# Patient Record
Sex: FEMALE | Race: BLACK OR AFRICAN AMERICAN | Employment: FULL TIME | URBAN - METROPOLITAN AREA
[De-identification: names, ages, dates, MRNs, and addresses within clinical notes are randomized per-mention and may not be internally consistent; named-entity substitution may affect disease eponyms.]

---

## 2017-04-25 PROBLEM — E55.9 UNSPECIFIED VITAMIN D DEFICIENCY: Status: ACTIVE | Noted: 2017-04-25

## 2017-04-25 PROBLEM — I10 ESSENTIAL HYPERTENSION: Status: ACTIVE | Noted: 2017-04-25

## 2017-04-25 PROBLEM — F51.01 PRIMARY INSOMNIA: Status: ACTIVE | Noted: 2017-04-25

## 2017-04-25 PROBLEM — E03.9 UNSPECIFIED HYPOTHYROIDISM: Status: ACTIVE | Noted: 2017-04-25

## 2017-05-09 ENCOUNTER — EMPLOYEE WELLNESS (OUTPATIENT)
Dept: OTHER | Age: 57
End: 2017-05-09

## 2017-05-09 LAB
CHOLESTEROL, TOTAL: 183 MG/DL (ref 0–199)
GLUCOSE BLD-MCNC: 87 MG/DL (ref 74–107)
HDLC SERPL-MCNC: 65 MG/DL
LDL CHOLESTEROL CALCULATED: 99 MG/DL (ref 0–99)
TRIGL SERPL-MCNC: 96 MG/DL (ref 0–149)

## 2018-03-20 VITALS — BODY MASS INDEX: 31.24 KG/M2 | WEIGHT: 183 LBS

## 2018-04-09 ENCOUNTER — EMPLOYEE WELLNESS (OUTPATIENT)
Dept: OTHER | Age: 58
End: 2018-04-09

## 2018-04-09 LAB
CHOLESTEROL, TOTAL: 193 MG/DL (ref 0–199)
GLUCOSE BLD-MCNC: 101 MG/DL (ref 74–107)
HDLC SERPL-MCNC: 66 MG/DL
LDL CHOLESTEROL CALCULATED: 104 MG/DL (ref 0–99)
TRIGL SERPL-MCNC: 114 MG/DL (ref 0–149)

## 2018-04-16 VITALS — BODY MASS INDEX: 37.9 KG/M2 | WEIGHT: 222 LBS

## 2018-07-27 ENCOUNTER — HOSPITAL ENCOUNTER (OUTPATIENT)
Age: 58
Discharge: HOME | End: 2018-07-27
Payer: COMMERCIAL

## 2018-07-27 DIAGNOSIS — E03.8 OTHER SPECIFIED HYPOTHYROIDISM: ICD-10-CM

## 2018-07-27 DIAGNOSIS — E55.9 VITAMIN D DEFICIENCY: ICD-10-CM

## 2018-07-27 DIAGNOSIS — I10 ESSENTIAL HYPERTENSION: ICD-10-CM

## 2018-07-27 DIAGNOSIS — R73.9 HYPERGLYCEMIA: ICD-10-CM

## 2018-07-27 LAB
ANION GAP SERPL CALCULATED.3IONS-SCNC: 10 MMOL/L (ref 7–16)
BUN BLDV-MCNC: 16 MG/DL (ref 8–23)
CALCIUM SERPL-MCNC: 9.5 MG/DL (ref 8.6–10.2)
CHLORIDE BLD-SCNC: 99 MMOL/L (ref 98–107)
CO2: 27 MMOL/L (ref 22–29)
CREAT SERPL-MCNC: 0.9 MG/DL (ref 0.5–1)
GFR AFRICAN AMERICAN: >60
GFR NON-AFRICAN AMERICAN: >60 ML/MIN/1.73
GLUCOSE BLD-MCNC: 91 MG/DL (ref 74–109)
HBA1C MFR BLD: 6.2 % (ref 4–5.6)
POTASSIUM SERPL-SCNC: 3.9 MMOL/L (ref 3.5–5)
SODIUM BLD-SCNC: 136 MMOL/L (ref 132–146)
T4 FREE: 1.03 NG/DL (ref 0.93–1.7)
TSH SERPL DL<=0.05 MIU/L-ACNC: 4.21 UIU/ML (ref 0.27–4.2)
VITAMIN D 25-HYDROXY: 16 NG/ML (ref 30–100)

## 2018-07-27 PROCEDURE — 84443 ASSAY THYROID STIM HORMONE: CPT

## 2018-07-27 PROCEDURE — 83036 HEMOGLOBIN GLYCOSYLATED A1C: CPT

## 2018-07-27 PROCEDURE — 80048 BASIC METABOLIC PNL TOTAL CA: CPT

## 2018-07-27 PROCEDURE — 82306 VITAMIN D 25 HYDROXY: CPT

## 2018-07-27 PROCEDURE — 84439 ASSAY OF FREE THYROXINE: CPT

## 2018-07-27 PROCEDURE — 36415 COLL VENOUS BLD VENIPUNCTURE: CPT

## 2018-08-27 ENCOUNTER — HOSPITAL ENCOUNTER (EMERGENCY)
Age: 58
Discharge: HOME | End: 2018-08-27
Payer: COMMERCIAL

## 2018-08-27 ENCOUNTER — APPOINTMENT (OUTPATIENT)
Dept: GENERAL RADIOLOGY | Age: 58
End: 2018-08-27
Payer: COMMERCIAL

## 2018-08-27 VITALS
HEART RATE: 77 BPM | HEIGHT: 64 IN | RESPIRATION RATE: 18 BRPM | BODY MASS INDEX: 33.97 KG/M2 | OXYGEN SATURATION: 98 % | WEIGHT: 199 LBS | SYSTOLIC BLOOD PRESSURE: 180 MMHG | DIASTOLIC BLOOD PRESSURE: 89 MMHG | TEMPERATURE: 98.1 F

## 2018-08-27 DIAGNOSIS — M17.12 OSTEOARTHRITIS OF LEFT KNEE, UNSPECIFIED OSTEOARTHRITIS TYPE: Primary | ICD-10-CM

## 2018-08-27 PROCEDURE — 99283 EMERGENCY DEPT VISIT LOW MDM: CPT

## 2018-08-27 PROCEDURE — 73562 X-RAY EXAM OF KNEE 3: CPT

## 2018-08-27 RX ORDER — NAPROXEN 500 MG/1
500 TABLET ORAL 2 TIMES DAILY
Qty: 14 TABLET | Refills: 0 | Status: SHIPPED | OUTPATIENT
Start: 2018-08-27 | End: 2019-12-28

## 2018-08-27 RX ORDER — PREDNISONE 10 MG/1
40 TABLET ORAL DAILY
Qty: 20 TABLET | Refills: 0 | Status: SHIPPED | OUTPATIENT
Start: 2018-08-27 | End: 2018-09-01

## 2018-08-27 ASSESSMENT — PAIN DESCRIPTION - LOCATION: LOCATION: KNEE

## 2018-08-27 ASSESSMENT — PAIN DESCRIPTION - ORIENTATION: ORIENTATION: LEFT

## 2018-08-27 ASSESSMENT — PAIN SCALES - GENERAL: PAINLEVEL_OUTOF10: 7

## 2018-08-27 NOTE — ED PROVIDER NOTES
[facility]    Department of Emergency Medicine   ED  Provider Note  Admit Date/RoomTime: 8/27/2018  3:01 PM  ED Room: 22/22  Chief Complaint   Knee Pain (FOR QUITE AWHILE/ NO SPEC INJ)    History of Present Illness   Source of history provided by:  patient. History/Exam Limitations: none. Mary Ng is a 59 y.o. old female who has a past medical history of:   Past Medical History:   Diagnosis Date    H/O cardiovascular stress test 01/11/2017    Lexiscan    HTN (hypertension)     Hypothyroid     Organic insomnia     Persistent disorder of initiating or maintaining sleep       presents to the emergency department by private vehicle, for pain located medially to left knee  which occured 2 week(s) prior to arrival.  The complaint occurred as a result of No known trauma, however she noticed a bruise to the same area approximately 2 weeks ago  while at home. She has noticed worsening of her symptoms over the last 3 days. There has been a history of no prior problems with this area in the past.  Since onset the symptoms have been moderate in degree. Her pain is aggraveated by movement, use and palpation and relieved by nothing. Her weight bearing ability:  Normal but painful. ROS    Pertinent positives and negatives are stated within HPI, all other systems reviewed and are negative. History reviewed. No pertinent surgical history. Social History:  reports that she has never smoked. She has never used smokeless tobacco. She reports that she drinks alcohol. She reports that she does not use drugs. Family History: family history is not on file. Allergies: Ciprofloxacin and Doxycycline    Physical Exam          ED Triage Vitals [08/27/18 1458]   BP Temp Temp Source Pulse Resp SpO2 Height Weight   (!) 180/89 98.1 °F (36.7 °C) Oral 77 18 98 % 5' 4\" (1.626 m) 199 lb (90.3 kg)       Oxygen Saturation Interpretation: Normal.    Constitutional:  Alert, development consistent with age.   Neck:  Normal ROM.  Supple. Knee:  Left medial:             Tenderness:  moderate. Swelling/Effusion: None. Deformity: no.              ROM: full range of motion. Skin:  no erythema, rash or wounds noted. Drawer's:  Negative. Lachman's: Negative. Apley's: Not Tested. Lito's: Not Tested. Valgus/Varus Stress: Negative. Crepitus: Negative. Hip:            Tenderness:  none. Swelling: None. Deformity: no.              ROM: full range of motion. Skin:  no erythema, rash or wounds noted. Joint(s) Below: ankle. Tenderness:  none. Swelling: No.              Deformity: no.             ROM: full range of motion. Skin:  no erythema, rash or wounds noted. Neurovascular: Motor deficit: none. Sensory deficit: none. Pulse deficit: none. Capillary refill: normal.  Gait:  limp. Lymphatics: No lymphangitis or adenopathy noted. Neurological:  Oriented. Motor functions intact. Lab / Imaging Results   (All laboratory and radiology results have been personally reviewed by myself)  Labs:  No results found for this visit on 08/27/18. Imaging: All Radiology results interpreted by Radiologist unless otherwise noted. XR KNEE LEFT (3 VIEWS)   Final Result   Degenerative changes and chondrocalcinosis. ED Course / Medical Decision Making   Medications - No data to display     Consult(s):   None    Procedure(s):   none. Medical Decision Making:    Patient presents to the emergency department for left knee pain. Radiographs were significant for degenerative changes, but without any acute abnormalities. Patient will be given the prescriptions as well and should follow-up with her primary care provider, as necessary. Counseling:    The emergency provider has spoken with the patient and discussed todays results, in addition to providing specific details for the plan of care and counseling regarding the diagnosis and prognosis. Questions are answered at this time and they are agreeable with the plan. Assessment      1. Osteoarthritis of left knee, unspecified osteoarthritis type       Plan   Discharge to home  Patient condition is good    New Medications     New Prescriptions    NAPROXEN (NAPROSYN) 500 MG TABLET    Take 1 tablet by mouth 2 times daily for 7 days    PREDNISONE (DELTASONE) 10 MG TABLET    Take 4 tablets by mouth daily for 5 days     Electronically signed by Libia Oliver PA-C   DD: 8/27/18  **This report was transcribed using voice recognition software. Every effort was made to ensure accuracy; however, inadvertent computerized transcription errors may be present.   END OF ED PROVIDER NOTE        Libia Oliver PA-C  08/27/18 1670

## 2018-09-27 ENCOUNTER — EVALUATION (OUTPATIENT)
Dept: PHYSICAL THERAPY | Age: 58
End: 2018-09-27
Payer: COMMERCIAL

## 2018-09-27 DIAGNOSIS — M25.562 ACUTE PAIN OF LEFT KNEE: Primary | ICD-10-CM

## 2018-09-27 PROCEDURE — 97161 PT EVAL LOW COMPLEX 20 MIN: CPT | Performed by: PHYSICAL THERAPIST

## 2018-09-27 NOTE — PROGRESS NOTES
Training          GT     Neuro Re-education NR     Modalities     Non-Billable Service Time     Other     Total Time/Units 60 1
affecting plan of care or recovery    [] Barriers to this patient's plan of care or recovery include. Domestic Concerns:  [x] No  [] Yes:    Short Term goals (3 weeks)  1. Decrease reported pain to 0-4/10  2. Increase ROM to 115 flex, 0 ext  3. Increase Strength to 4+/5   4. Able to perform/complete the following functions/tasks: daily indoor/outdoor activities     Long Term goals (6 weeks)  1. Decrease reported pain to 0/10  2. Increase ROM to symmetrical  3. Increase Strength to 5/5   4. Able to perform/complete the following functions/tasks: participate in exercise regimen   5. Independent with Home Exercise Programs    Outcome Measure: PT G-Codes  Functional Limitation: Mobility: Walking and moving around  Mobility: Walking and Moving Around Current Status (): At least 40 percent but less than 60 percent impaired, limited or restricted  Mobility: Walking and Moving Around Goal Status (): 0 percent impaired, limited or restricted    Rehab Potential: [x] Good  [] Fair  [] Poor    PLAN       Treatment Plan:  [x] Therapeutic Exercise  [x] Therapeutic Activity  [x] Neuromuscular Re-education   [] Gait Training  [] Balance Training  [] Aerobic conditioning  [] Manual Therapy  [] Massage/Fascial release   [] Work/Sport specific activities    [] Pain Neuroscience [x] Cold/hotpack  [] Vasocompression  [] Electrical Stimulation  [] Lumbar/Cervical Traction  [] Ultrasound   [] Iontophoresis: 4 mg/mL Dexamethasone Sodium Phosphate 40-80 mAmin        [x] Instruction in HEP      []  Medication allergies reviewed for use of Dexamethasone Sodium Phosphate 4mg/ml  with iontophoresis treatments. Patient is not allergic. Suggested Professional Referral: [x] No  [] Yes:     Patient Education:  [x] Plans/Goals, Risks/Benefits discussed  [x] Home exercise program  Method of Education: [x] Verbal  [x] Demo  [x] Written  Comprehension of Education:  [x] Verbalizes understanding. [x] Demonstrates understanding.   []

## 2018-10-15 ENCOUNTER — TREATMENT (OUTPATIENT)
Dept: PHYSICAL THERAPY | Age: 58
End: 2018-10-15
Payer: COMMERCIAL

## 2018-10-15 DIAGNOSIS — M25.562 ACUTE PAIN OF LEFT KNEE: Primary | ICD-10-CM

## 2018-10-15 PROCEDURE — 97110 THERAPEUTIC EXERCISES: CPT

## 2018-10-18 ENCOUNTER — TREATMENT (OUTPATIENT)
Dept: PHYSICAL THERAPY | Age: 58
End: 2018-10-18
Payer: COMMERCIAL

## 2018-10-18 DIAGNOSIS — M25.562 ACUTE PAIN OF LEFT KNEE: Primary | ICD-10-CM

## 2018-10-18 PROCEDURE — 97016 VASOPNEUMATIC DEVICE THERAPY: CPT

## 2018-10-18 PROCEDURE — 97110 THERAPEUTIC EXERCISES: CPT

## 2018-10-18 NOTE — PROGRESS NOTES
Physical Therapy Daily Treatment Note    Date: 10/18/2018  Patient Name: Leonardo Mclain  : 1960   MRN: [id number]  DOInjury: ~6 months (~2018)  Referring Provider: Bella Lagunas, [facility],[address], [address] [facility]     Medical Diagnosis:     1. Acute pain of left knee  Outcome Measure:    PT G-Codes  Functional Limitation: Mobility: Walking and moving around  Mobility: Walking and Moving Around Current Status (): At least 40 percent but less than 60 percent impaired, limited or restricted  Mobility: Walking and Moving Around Goal Status (): 0 percent impaired, limited or restricted    Patient Goal: Get back to running and exercising     S: pt reports very sore with swelling  O:   Time 3712-7693     Visit 3 /18            Pain 7/10     ROM -5 ext, 105 flex     Modalities      Ice, compression, elevation  75 mmHg x 15 min  MO   Stretch      Heel prop Add as nec  TE   Knee flex stretch  Add as nec  TE   Exercise      Nustep L 5 x 10 min            TE   Bike      Treadmill      Heel slides 3 x 15  TE   QS 5 sec hold x 30  TE   SLR 3 x 10  TE   SAQ   TE   Bridging       Knee Extension Machine   TE   Hamstring Curl Machine   TE   [] TG  [] Leg Press 2-leg   TE   [] TG  [] Leg Press 1-leg   TE   Step-ups - FWD   TE   Step-ups - LAT   TE   Step-ups - BWD   TE   Calf Raises   TE      TE      TE               A:  Tolerated well.    P: Continue with rehab plan  Aguilar Tilley PTA    Treatment Charges: Mins Units   Initial Evaluation     Re-Evaluation     Ther Exercise         TE 30 min 2   Manual Therapy     MT     Ther Activities        TA     Gait Training          GT     Neuro Re-education NR     Modalities 15 1   Non-Billable Service Time     Other     Total Time/Units 45 3

## 2018-10-25 ENCOUNTER — TREATMENT (OUTPATIENT)
Dept: PHYSICAL THERAPY | Age: 58
End: 2018-10-25
Payer: COMMERCIAL

## 2018-10-25 DIAGNOSIS — M25.562 ACUTE PAIN OF LEFT KNEE: Primary | ICD-10-CM

## 2018-10-25 PROCEDURE — 97110 THERAPEUTIC EXERCISES: CPT | Performed by: PHYSICAL THERAPIST

## 2018-10-25 NOTE — PROGRESS NOTES
Physical Therapy Daily Treatment Note    Date: 10/25/2018  Patient Name: Phyllis Mcleod  : 1960   MRN: [id number]  DOInjury: ~6 months (~2018)  Referring Provider: Mary Ann Patten, [address] [facility],[address], [address] [facility]     Medical Diagnosis:     1. Acute pain of left knee  Outcome Measure:    PT G-Codes  Functional Limitation: Mobility: Walking and moving around  Mobility: Walking and Moving Around Current Status (): At least 40 percent but less than 60 percent impaired, limited or restricted  Mobility: Walking and Moving Around Goal Status (): 0 percent impaired, limited or restricted    Patient Goal: Get back to running and exercising     S: Pain increasing over the last few days due to increased weightbearing and use at home. O: TTP medial joint line and medal and lateral patella rim. Time 9931-3694     Visit             Pain 7/10     ROM      Modalities      Ice x 15 min  MO   Stretch      Heel prop Add as nec  TE   Knee flex stretch  Add as nec  TE   Exercise      Nustep L 3 x 10 min            TE   Bike      Treadmill      Heel slides 3 x 15 6\" black bolster under knee to limit extension TE   QS 5 sec hold 3 x 10 Triangle bolster under knee TE   SLR   TE   SAQ   TE   Bridging  Next      Knee Extension Machine   TE   Hamstring Curl Machine   TE   [] TG  [] Leg Press 2-leg   TE   [] TG  [] Leg Press 1-leg   TE   Step-ups - FWD   TE   Step-ups - LAT   TE   Step-ups - BWD   TE   Calf Raises   TE      TE      TE               A:  Tolerated well. Modified exercise as above due to recent painful onset. Patient reports these modifications allowed her to exercise with comfort. P: Continue with rehab plan. Will work on adding above exercises back in and adding weightbearing slowly.    Onita Course, PT    Treatment Charges: Mins Units   Initial Evaluation     Re-Evaluation     Ther Exercise         TE 30  2   Manual Therapy     MT     Ther Activities        TA     Gait Training          GT     Neuro Re-education NR     Modalities 15 0   Non-Billable Service Time     Other     Total Time/Units 45 2

## 2018-11-01 ENCOUNTER — TREATMENT (OUTPATIENT)
Dept: PHYSICAL THERAPY | Age: 58
End: 2018-11-01
Payer: COMMERCIAL

## 2018-11-01 DIAGNOSIS — M25.562 ACUTE PAIN OF LEFT KNEE: Primary | ICD-10-CM

## 2018-11-01 PROCEDURE — 97110 THERAPEUTIC EXERCISES: CPT

## 2018-11-29 ENCOUNTER — TREATMENT (OUTPATIENT)
Dept: PHYSICAL THERAPY | Age: 58
End: 2018-11-29
Payer: COMMERCIAL

## 2018-11-29 DIAGNOSIS — M25.562 ACUTE PAIN OF LEFT KNEE: Primary | ICD-10-CM

## 2018-11-29 PROCEDURE — 97110 THERAPEUTIC EXERCISES: CPT | Performed by: PHYSICAL THERAPIST

## 2019-03-08 ENCOUNTER — EMPLOYEE WELLNESS (OUTPATIENT)
Dept: OTHER | Age: 59
End: 2019-03-08

## 2019-03-08 LAB
CHOLESTEROL, TOTAL: 212 MG/DL (ref 0–199)
GLUCOSE BLD-MCNC: 115 MG/DL (ref 74–107)
HDLC SERPL-MCNC: 63 MG/DL
LDL CHOLESTEROL CALCULATED: 120 MG/DL (ref 0–99)
TRIGL SERPL-MCNC: 147 MG/DL (ref 0–149)

## 2019-03-20 VITALS — WEIGHT: 223 LBS | BODY MASS INDEX: 38.28 KG/M2

## 2019-06-20 ENCOUNTER — HOSPITAL ENCOUNTER (OUTPATIENT)
Age: 59
Discharge: HOME | End: 2019-06-20
Payer: COMMERCIAL

## 2019-06-20 DIAGNOSIS — D50.0 IRON DEFICIENCY ANEMIA SECONDARY TO BLOOD LOSS (CHRONIC): ICD-10-CM

## 2019-06-20 DIAGNOSIS — E03.9 PRIMARY HYPOTHYROIDISM: ICD-10-CM

## 2019-06-20 DIAGNOSIS — E78.01 FAMILIAL HYPERCHOLESTEROLEMIA: ICD-10-CM

## 2019-06-20 DIAGNOSIS — E55.9 VITAMIN D DEFICIENCY: ICD-10-CM

## 2019-06-20 LAB
ALBUMIN SERPL-MCNC: 4.3 G/DL (ref 3.5–5.2)
ALP BLD-CCNC: 69 U/L (ref 35–104)
ALT SERPL-CCNC: 54 U/L (ref 0–32)
ANION GAP SERPL CALCULATED.3IONS-SCNC: 11 MMOL/L (ref 7–16)
AST SERPL-CCNC: 33 U/L (ref 0–31)
BILIRUB SERPL-MCNC: 0.4 MG/DL (ref 0–1.2)
BUN BLDV-MCNC: 17 MG/DL (ref 8–23)
CALCIUM SERPL-MCNC: 10 MG/DL (ref 8.6–10.2)
CHLORIDE BLD-SCNC: 100 MMOL/L (ref 98–107)
CO2: 29 MMOL/L (ref 22–29)
CREAT SERPL-MCNC: 1 MG/DL (ref 0.5–1)
GFR AFRICAN AMERICAN: >60
GFR NON-AFRICAN AMERICAN: >60 ML/MIN/1.73
GLUCOSE BLD-MCNC: 103 MG/DL (ref 74–99)
HBA1C MFR BLD: 6.2 % (ref 4–5.6)
HCT VFR BLD CALC: 37.8 % (ref 34–48)
HEMOGLOBIN: 12.3 G/DL (ref 11.5–15.5)
MCH RBC QN AUTO: 28.1 PG (ref 26–35)
MCHC RBC AUTO-ENTMCNC: 32.5 % (ref 32–34.5)
MCV RBC AUTO: 86.3 FL (ref 80–99.9)
PDW BLD-RTO: 14.7 FL (ref 11.5–15)
PLATELET # BLD: 245 E9/L (ref 130–450)
PMV BLD AUTO: 11.1 FL (ref 7–12)
POTASSIUM SERPL-SCNC: 3.9 MMOL/L (ref 3.5–5)
RBC # BLD: 4.38 E12/L (ref 3.5–5.5)
SODIUM BLD-SCNC: 140 MMOL/L (ref 132–146)
T4 FREE: 1.17 NG/DL (ref 0.93–1.7)
TOTAL PROTEIN: 8.1 G/DL (ref 6.4–8.3)
TSH SERPL DL<=0.05 MIU/L-ACNC: 6.3 UIU/ML (ref 0.27–4.2)
VITAMIN D 25-HYDROXY: 30 NG/ML (ref 30–100)
WBC # BLD: 5.9 E9/L (ref 4.5–11.5)

## 2019-06-20 PROCEDURE — 82306 VITAMIN D 25 HYDROXY: CPT

## 2019-06-20 PROCEDURE — 85027 COMPLETE CBC AUTOMATED: CPT

## 2019-06-20 PROCEDURE — 84439 ASSAY OF FREE THYROXINE: CPT

## 2019-06-20 PROCEDURE — 84443 ASSAY THYROID STIM HORMONE: CPT

## 2019-06-20 PROCEDURE — 36415 COLL VENOUS BLD VENIPUNCTURE: CPT

## 2019-06-20 PROCEDURE — 80053 COMPREHEN METABOLIC PANEL: CPT

## 2019-06-20 PROCEDURE — 83036 HEMOGLOBIN GLYCOSYLATED A1C: CPT

## 2019-11-26 ENCOUNTER — HOSPITAL ENCOUNTER (OUTPATIENT)
Dept: MAMMOGRAPHY | Age: 59
Discharge: HOME | End: 2019-11-28
Payer: COMMERCIAL

## 2019-11-26 DIAGNOSIS — N64.4 BREAST PAIN, LEFT: ICD-10-CM

## 2019-11-26 DIAGNOSIS — Z12.31 SCREENING MAMMOGRAM FOR HIGH-RISK PATIENT: ICD-10-CM

## 2019-11-26 PROCEDURE — 77063 BREAST TOMOSYNTHESIS BI: CPT

## 2019-12-28 ENCOUNTER — HOSPITAL ENCOUNTER (EMERGENCY)
Age: 59
Discharge: HOME | End: 2019-12-28
Payer: COMMERCIAL

## 2019-12-28 ENCOUNTER — APPOINTMENT (OUTPATIENT)
Dept: GENERAL RADIOLOGY | Age: 59
End: 2019-12-28
Payer: COMMERCIAL

## 2019-12-28 VITALS
RESPIRATION RATE: 20 BRPM | BODY MASS INDEX: 38.65 KG/M2 | OXYGEN SATURATION: 95 % | WEIGHT: 226.38 LBS | DIASTOLIC BLOOD PRESSURE: 86 MMHG | SYSTOLIC BLOOD PRESSURE: 136 MMHG | TEMPERATURE: 101.2 F | HEIGHT: 64 IN | HEART RATE: 100 BPM

## 2019-12-28 DIAGNOSIS — J02.9 ACUTE PHARYNGITIS, UNSPECIFIED ETIOLOGY: ICD-10-CM

## 2019-12-28 DIAGNOSIS — J40 BRONCHITIS: ICD-10-CM

## 2019-12-28 DIAGNOSIS — J01.10 ACUTE NON-RECURRENT FRONTAL SINUSITIS: Primary | ICD-10-CM

## 2019-12-28 PROCEDURE — 99212 OFFICE O/P EST SF 10 MIN: CPT

## 2019-12-28 PROCEDURE — 71046 X-RAY EXAM CHEST 2 VIEWS: CPT

## 2019-12-28 PROCEDURE — 6370000000 HC RX 637 (ALT 250 FOR IP): Performed by: PHYSICIAN ASSISTANT

## 2019-12-28 RX ORDER — AMOXICILLIN AND CLAVULANATE POTASSIUM 500; 125 MG/1; MG/1
1 TABLET, FILM COATED ORAL 3 TIMES DAILY
Qty: 30 TABLET | Refills: 0 | Status: SHIPPED | OUTPATIENT
Start: 2019-12-28 | End: 2020-01-07

## 2019-12-28 RX ORDER — ZOLPIDEM TARTRATE 5 MG/1
5 TABLET ORAL NIGHTLY PRN
COMMUNITY
End: 2020-11-30 | Stop reason: SDUPTHER

## 2019-12-28 RX ORDER — GUAIFENESIN AND DEXTROMETHORPHAN HYDROBROMIDE 1200; 60 MG/1; MG/1
1 TABLET, EXTENDED RELEASE ORAL EVERY 12 HOURS PRN
Qty: 12 TABLET | Refills: 0 | Status: SHIPPED | OUTPATIENT
Start: 2019-12-28 | End: 2020-09-08

## 2019-12-28 RX ORDER — ACETAMINOPHEN 500 MG
1000 TABLET ORAL ONCE
Status: COMPLETED | OUTPATIENT
Start: 2019-12-28 | End: 2019-12-28

## 2019-12-28 RX ORDER — ALBUTEROL SULFATE 90 UG/1
2 AEROSOL, METERED RESPIRATORY (INHALATION) 4 TIMES DAILY PRN
Qty: 1 INHALER | Refills: 0 | Status: SHIPPED | OUTPATIENT
Start: 2019-12-28 | End: 2020-11-30 | Stop reason: SDUPTHER

## 2019-12-28 RX ORDER — PREDNISONE 10 MG/1
TABLET ORAL
Qty: 14 TABLET | Refills: 0 | Status: SHIPPED | OUTPATIENT
Start: 2019-12-28 | End: 2020-09-08

## 2019-12-28 RX ADMIN — ACETAMINOPHEN 1000 MG: 500 TABLET, FILM COATED ORAL at 17:16

## 2019-12-28 ASSESSMENT — PAIN DESCRIPTION - ONSET: ONSET: ON-GOING

## 2019-12-28 ASSESSMENT — PAIN SCALES - GENERAL: PAINLEVEL_OUTOF10: 8

## 2019-12-28 ASSESSMENT — PAIN DESCRIPTION - PROGRESSION: CLINICAL_PROGRESSION: GRADUALLY WORSENING

## 2019-12-28 ASSESSMENT — PAIN DESCRIPTION - FREQUENCY: FREQUENCY: CONTINUOUS

## 2019-12-28 ASSESSMENT — PAIN DESCRIPTION - PAIN TYPE: TYPE: ACUTE PAIN

## 2019-12-28 ASSESSMENT — PAIN DESCRIPTION - LOCATION: LOCATION: HEAD

## 2019-12-28 ASSESSMENT — PAIN DESCRIPTION - DESCRIPTORS: DESCRIPTORS: ACHING

## 2020-01-24 ENCOUNTER — HOSPITAL ENCOUNTER (OUTPATIENT)
Age: 60
Discharge: HOME | End: 2020-01-24
Payer: COMMERCIAL

## 2020-01-24 LAB
ALBUMIN SERPL-MCNC: 4.6 G/DL (ref 3.5–5.2)
ALP BLD-CCNC: 54 U/L (ref 35–104)
ALT SERPL-CCNC: 32 U/L (ref 0–32)
ANION GAP SERPL CALCULATED.3IONS-SCNC: 14 MMOL/L (ref 7–16)
AST SERPL-CCNC: 26 U/L (ref 0–31)
BILIRUB SERPL-MCNC: 0.6 MG/DL (ref 0–1.2)
BUN BLDV-MCNC: 22 MG/DL (ref 8–23)
CALCIUM SERPL-MCNC: 10.5 MG/DL (ref 8.6–10.2)
CHLORIDE BLD-SCNC: 97 MMOL/L (ref 98–107)
CHOLESTEROL, TOTAL: 170 MG/DL (ref 0–199)
CO2: 29 MMOL/L (ref 22–29)
CREAT SERPL-MCNC: 1 MG/DL (ref 0.5–1)
GFR AFRICAN AMERICAN: >60
GFR NON-AFRICAN AMERICAN: >60 ML/MIN/1.73
GLUCOSE BLD-MCNC: 103 MG/DL (ref 74–99)
HBA1C MFR BLD: 6.1 % (ref 4–5.6)
HDLC SERPL-MCNC: 57 MG/DL
LDL CHOLESTEROL CALCULATED: 91 MG/DL (ref 0–99)
POTASSIUM SERPL-SCNC: 3.6 MMOL/L (ref 3.5–5)
SODIUM BLD-SCNC: 140 MMOL/L (ref 132–146)
T4 FREE: 0.89 NG/DL (ref 0.93–1.7)
TOTAL PROTEIN: 8.3 G/DL (ref 6.4–8.3)
TRIGL SERPL-MCNC: 109 MG/DL (ref 0–149)
TSH SERPL DL<=0.05 MIU/L-ACNC: 5.25 UIU/ML (ref 0.27–4.2)
VLDLC SERPL CALC-MCNC: 22 MG/DL

## 2020-01-24 PROCEDURE — 84439 ASSAY OF FREE THYROXINE: CPT

## 2020-01-24 PROCEDURE — 83036 HEMOGLOBIN GLYCOSYLATED A1C: CPT

## 2020-01-24 PROCEDURE — 80061 LIPID PANEL: CPT

## 2020-01-24 PROCEDURE — 36415 COLL VENOUS BLD VENIPUNCTURE: CPT

## 2020-01-24 PROCEDURE — 80053 COMPREHEN METABOLIC PANEL: CPT

## 2020-01-24 PROCEDURE — 84443 ASSAY THYROID STIM HORMONE: CPT

## 2020-09-07 NOTE — PROGRESS NOTES
facility-administered medications for this visit. Allergies as of 09/08/2020 - Review Complete 09/04/2020   Allergen Reaction Noted    Ciprofloxacin  07/16/2018    Doxycycline  01/11/2017       Social History     Socioeconomic History    Marital status:      Spouse name: Not on file    Number of children: Not on file    Years of education: Not on file    Highest education level: Not on file   Occupational History    Not on file   Social Needs    Financial resource strain: Not on file    Food insecurity     Worry: Not on file     Inability: Not on file   [employer] needs     Medical: Not on file     Non-medical: Not on file   Tobacco Use    Smoking status: Never Smoker    Smokeless tobacco: Never Used   Substance and Sexual Activity    Alcohol use: Yes     Comment: socially.   No coffee for a week     Drug use: No    Sexual activity: Yes     Partners: Male   Lifestyle    Physical activity     Days per week: Not on file     Minutes per session: Not on file    Stress: Not on file   Relationships    Social connections     Talks on phone: Not on file     Gets together: Not on file     Attends [employer] service: Not on file     Active member of club or organization: Not on file     Attends meetings of clubs or organizations: Not on file     Relationship status: Not on file    Intimate partner violence     Fear of current or ex partner: Not on file     Emotionally abused: Not on file     Physically abused: Not on file     Forced sexual activity: Not on file   Other Topics Concern    Not on file   Social History Narrative    Not on file       Family History   Problem Relation Age of Onset    Kidney Disease Mother     Cancer Father        REVIEW OF SYSTEMS:     CONSTITUTIONAL:  negative for  fevers, chills, sweats, + fatigue  EYES:  negative for  double vision, blurred vision and blind spots  HEENT:  negative for  tinnitus, earaches, nasal congestion and epistaxis  RESPIRATORY: negative for  dry cough, cough with sputum, dyspnea, wheezing and hemoptysis  CARDIOVASCULAR: as per HPI  GASTROINTESTINAL:  negative for nausea, vomiting, diarrhea, constipation, pruritus and jaundice  GENITOURINARY:  negative for frequency, dysuria, nocturia, urinary incontinence and hesitancy  HEMATOLOGIC/LYMPHATIC:  negative for easy bruising, bleeding, lymphadenopathy and petechiae  ALLERGIC/IMMUNOLOGIC:  negative for urticaria, hay fever and angioedema  ENDOCRINE:  negative for heat intolerance, cold intolerance, tremor, hair loss and diabetic symptoms including neither polyuria nor polydipsia nor blurred vision  MUSCULOSKELETAL:  negative for  myalgias, arthralgias, joint swelling, stiff joints and decreased range of motion  NEUROLOGICAL:  negative for memory problems, speech problems, visual disturbance, dysphagia, weakness and numbness      PHYSICAL EXAM:   CONSTITUTIONAL:  awake, alert, cooperative, no apparent distress, and appears stated age  EYES:  lids and lashes normal and pupils equal, round and reactive to light, anicteric sclerae  HEAD:  normocepalic, without obvious abnormality, atraumatic, pink, moist mucous membranes. NECK:  Supple, symmetrical, trachea midline, no adenopathy, thyroid symmetric, not enlarged and no tenderness, skin normal  HEMATOLOGIC/LYMPHATICS:  no cervical lymphadenopathy and no supraclavicular lymphadenopathy  LUNGS:  No increased work of breathing, good air exchange, clear to auscultation bilaterally, no crackles or wheezing  CARDIOVASCULAR:  Normal apical impulse, regular rate and rhythm, normal S1 and S2, no S3 or S4, and no murmur noted and no JVD, no carotid bruit, no pedal edema, good carotid upstroke bilaterally. ABDOMEN:  Soft, nontender, no masses, no hepatomegaly or splenomegaly, BS+  CHEST: nontender to palpation, expands symmetrically  MUSCULOSKELETAL:  No clubbing no cyanosis. there is no redness, warmth, or swelling of the joints  full range of motion noted  NEUROLOGIC:  Alert, awake,oriented x3, no focal neurologic deficit was appreciated  SKIN:  no bruising or bleeding, normal skin color, texture, turgor and no redness, warmth, or swelling    /86 (Site: Left Upper Arm, Position: Sitting, Cuff Size: Medium Adult)   Pulse 70   Ht 5' 4\" (1.626 m)   Wt 200 lb (90.7 kg)   BMI 34.33 kg/m²     DATA:   I personally reviewed the visit EKG with the following interpretation: Sinus rhythm, poor R wave progression    EKG 1/3/17 Sinus Rhythm    ECHO: 1/11/17 Summary   No previous echo for comparison. No previous echo for comparison. Mild concentric left ventricular hypertrophy   Ejection fraction is visually estimated at 65%. No regional wall motion abnormalities seen. There is doppler evidence of stage II diastolic dysfunction. Mild mitral regurgitation is present. Mild tricuspid regurgitation. RVSP is 39 mmHg. Pulmonary hypertension is mild . Stress Test: 1/11/17   Findings:    Both the stress and resting myocardial images are normal.         The gated left ventricular ejection fraction is 68 %.                Impression    1.  Normal study           Cardiology Labs: BMP:    Lab Results   Component Value Date     09/09/2020    K 3.8 09/09/2020    CL 98 09/09/2020    CO2 25 09/09/2020    BUN 17 09/09/2020    CREATININE 0.9 09/09/2020     CMP:    Lab Results   Component Value Date     09/09/2020    K 3.8 09/09/2020    CL 98 09/09/2020    CO2 25 09/09/2020    BUN 17 09/09/2020    CREATININE 0.9 09/09/2020    PROT 8.4 09/09/2020     CBC:    Lab Results   Component Value Date    WBC 5.9 09/09/2020    RBC 4.34 09/09/2020    HGB 12.5 09/09/2020    HCT 38.3 09/09/2020    MCV 88.2 09/09/2020    RDW 14.6 09/09/2020    PLT 75 09/09/2020     PT/INR:  No results found for: PTINR  PT/INR Warfarin:  No components found for: PTPATWAR, PTINRWAR  PTT:  No results found for: APTT  PTT Heparin:  No components found for: APTTHEP  Magnesium:    Lab Results Component Value Date    MG 2.1 06/26/2014     TSH:    Lab Results   Component Value Date    TSH 8.990 09/09/2020     TROPONIN:  No components found for: TROP  BNP:  No results found for: BNP  FASTING LIPID PANEL:    Lab Results   Component Value Date    CHOL 170 01/24/2020    HDL 57 01/24/2020    TRIG 109 01/24/2020     NM Cardiac Stress Test Nuclear Imaging    (Results Pending)     I have personally reviewed the laboratory, cardiac diagnostic and radiographic testing as outlined above:      IMPRESSION:  1. Chest pain: Atypical, abnormal EKG with poor R wave progression, risk factors for CAD including hypertension and family history of CAD, will schedule for nuclear exercise stress test  2. Palpitations: Will schedule for Holter monitor for further evaluation  3. Hypothyroidism: On supplements    RECOMMENDATIONS:   1. Nuclear exercise stress test  2. Holter monitor  3. CBC, basic metabolic panel and TSH  4. Patient was strongly advised to call 911 if symptoms recur or get worse for any reason  5. Follow-up with Dr. Neil Velazquez as scheduled  6. Follow-up with Dr. Marcelo Hart after her tests    I have reviewed my findings and recommendations with patient    Thank you for the consult  Electronically signed by Tommy Salgado MD on 9/13/2020 at 7:53 PM      NOTE: This report was transcribed using voice recognition software.  Every effort was made to ensure accuracy; however, inadvertent computerized transcription errors may be present

## 2020-09-08 ENCOUNTER — NURSE ONLY (OUTPATIENT)
Dept: CARDIOLOGY CLINIC | Age: 60
End: 2020-09-08

## 2020-09-08 ENCOUNTER — OFFICE VISIT (OUTPATIENT)
Dept: CARDIOLOGY CLINIC | Age: 60
End: 2020-09-08
Payer: COMMERCIAL

## 2020-09-08 VITALS
DIASTOLIC BLOOD PRESSURE: 86 MMHG | WEIGHT: 200 LBS | BODY MASS INDEX: 34.15 KG/M2 | SYSTOLIC BLOOD PRESSURE: 132 MMHG | HEIGHT: 64 IN | HEART RATE: 70 BPM

## 2020-09-08 PROCEDURE — 99244 OFF/OP CNSLTJ NEW/EST MOD 40: CPT | Performed by: INTERNAL MEDICINE

## 2020-09-08 PROCEDURE — 93000 ELECTROCARDIOGRAM COMPLETE: CPT | Performed by: INTERNAL MEDICINE

## 2020-09-08 NOTE — PROGRESS NOTES
Patient was seen today by Dr. Veronica Parham and a 24 hour holter monitor was ordered. She was given instructions and voiced understanding. Monitor was applied.

## 2020-09-09 ENCOUNTER — HOSPITAL ENCOUNTER (OUTPATIENT)
Age: 60
Discharge: HOME | End: 2020-09-09
Payer: COMMERCIAL

## 2020-09-09 LAB
ALBUMIN SERPL-MCNC: 4.5 G/DL (ref 3.5–5.2)
ALP BLD-CCNC: 66 U/L (ref 35–104)
ALT SERPL-CCNC: 19 U/L (ref 0–32)
ANION GAP SERPL CALCULATED.3IONS-SCNC: 15 MMOL/L (ref 7–16)
AST SERPL-CCNC: 27 U/L (ref 0–31)
BASOPHILS ABSOLUTE: 0.02 E9/L (ref 0–0.2)
BASOPHILS RELATIVE PERCENT: 0.3 % (ref 0–2)
BILIRUB SERPL-MCNC: 0.4 MG/DL (ref 0–1.2)
BUN BLDV-MCNC: 17 MG/DL (ref 8–23)
CALCIUM SERPL-MCNC: 10 MG/DL (ref 8.6–10.2)
CHLORIDE BLD-SCNC: 98 MMOL/L (ref 98–107)
CO2: 25 MMOL/L (ref 22–29)
CREAT SERPL-MCNC: 0.9 MG/DL (ref 0.5–1)
EOSINOPHILS ABSOLUTE: 0.09 E9/L (ref 0.05–0.5)
EOSINOPHILS RELATIVE PERCENT: 1.5 % (ref 0–6)
GFR AFRICAN AMERICAN: >60
GFR NON-AFRICAN AMERICAN: >60 ML/MIN/1.73
GLUCOSE BLD-MCNC: 94 MG/DL (ref 74–99)
HCT VFR BLD CALC: 38.3 % (ref 34–48)
HEMOGLOBIN: 12.5 G/DL (ref 11.5–15.5)
IMMATURE GRANULOCYTES #: 0.01 E9/L
IMMATURE GRANULOCYTES %: 0.2 % (ref 0–5)
LYMPHOCYTES ABSOLUTE: 2.86 E9/L (ref 1.5–4)
LYMPHOCYTES RELATIVE PERCENT: 48.2 % (ref 20–42)
MCH RBC QN AUTO: 28.8 PG (ref 26–35)
MCHC RBC AUTO-ENTMCNC: 32.6 % (ref 32–34.5)
MCV RBC AUTO: 88.2 FL (ref 80–99.9)
MONOCYTES ABSOLUTE: 0.48 E9/L (ref 0.1–0.95)
MONOCYTES RELATIVE PERCENT: 8.1 % (ref 2–12)
NEUTROPHILS ABSOLUTE: 2.47 E9/L (ref 1.8–7.3)
NEUTROPHILS RELATIVE PERCENT: 41.7 % (ref 43–80)
PDW BLD-RTO: 14.6 FL (ref 11.5–15)
PLATELET # BLD: 75 E9/L (ref 130–450)
PLATELET CONFIRMATION: NORMAL
PMV BLD AUTO: 11.6 FL (ref 7–12)
POTASSIUM SERPL-SCNC: 3.8 MMOL/L (ref 3.5–5)
RBC # BLD: 4.34 E12/L (ref 3.5–5.5)
SODIUM BLD-SCNC: 138 MMOL/L (ref 132–146)
TOTAL PROTEIN: 8.4 G/DL (ref 6.4–8.3)
TSH SERPL DL<=0.05 MIU/L-ACNC: 8.99 UIU/ML (ref 0.27–4.2)
WBC # BLD: 5.9 E9/L (ref 4.5–11.5)

## 2020-09-09 PROCEDURE — 84443 ASSAY THYROID STIM HORMONE: CPT

## 2020-09-09 PROCEDURE — 80053 COMPREHEN METABOLIC PANEL: CPT

## 2020-09-09 PROCEDURE — 36415 COLL VENOUS BLD VENIPUNCTURE: CPT

## 2020-09-09 PROCEDURE — 85025 COMPLETE CBC W/AUTO DIFF WBC: CPT

## 2020-09-18 ENCOUNTER — TELEPHONE (OUTPATIENT)
Dept: CARDIOLOGY | Age: 60
End: 2020-09-18

## 2020-09-22 ENCOUNTER — HOSPITAL ENCOUNTER (OUTPATIENT)
Dept: CARDIOLOGY | Age: 60
Discharge: HOME | End: 2020-09-22
Payer: COMMERCIAL

## 2020-09-22 VITALS
HEIGHT: 64 IN | SYSTOLIC BLOOD PRESSURE: 122 MMHG | WEIGHT: 200 LBS | DIASTOLIC BLOOD PRESSURE: 74 MMHG | BODY MASS INDEX: 34.15 KG/M2 | OXYGEN SATURATION: 98 % | RESPIRATION RATE: 20 BRPM | HEART RATE: 60 BPM

## 2020-09-22 LAB
LV EF: 73 %
LVEF MODALITY: NORMAL

## 2020-09-22 PROCEDURE — 3430000000 HC RX DIAGNOSTIC RADIOPHARMACEUTICAL: Performed by: INTERNAL MEDICINE

## 2020-09-22 PROCEDURE — A9502 TC99M TETROFOSMIN: HCPCS | Performed by: INTERNAL MEDICINE

## 2020-09-22 PROCEDURE — 78452 HT MUSCLE IMAGE SPECT MULT: CPT

## 2020-09-22 PROCEDURE — 93017 CV STRESS TEST TRACING ONLY: CPT

## 2020-09-22 PROCEDURE — 93018 CV STRESS TEST I&R ONLY: CPT | Performed by: INTERNAL MEDICINE

## 2020-09-22 PROCEDURE — 2580000003 HC RX 258: Performed by: INTERNAL MEDICINE

## 2020-09-22 PROCEDURE — 93016 CV STRESS TEST SUPVJ ONLY: CPT | Performed by: INTERNAL MEDICINE

## 2020-09-22 RX ORDER — SODIUM CHLORIDE 0.9 % (FLUSH) 0.9 %
10 SYRINGE (ML) INJECTION PRN
Status: DISCONTINUED | OUTPATIENT
Start: 2020-09-22 | End: 2020-09-23 | Stop reason: HOSPADM

## 2020-09-22 RX ADMIN — SODIUM CHLORIDE, PRESERVATIVE FREE 10 ML: 5 INJECTION INTRAVENOUS at 08:02

## 2020-09-22 RX ADMIN — TETROFOSMIN 31 MILLICURIE: 0.23 INJECTION, POWDER, LYOPHILIZED, FOR SOLUTION INTRAVENOUS at 09:24

## 2020-09-22 RX ADMIN — SODIUM CHLORIDE, PRESERVATIVE FREE 10 ML: 5 INJECTION INTRAVENOUS at 09:24

## 2020-09-22 RX ADMIN — TETROFOSMIN 10 MILLICURIE: 0.23 INJECTION, POWDER, LYOPHILIZED, FOR SOLUTION INTRAVENOUS at 08:02

## 2020-09-22 NOTE — PROCEDURES
[address] [facility]. Molly Almonte [address] [place], [place] [phone]                 Exercise Stress Nuclear Gated SPECT Study    Name: [address] [place] [address] Account Number: [de-identified]    :  1960      Sex: female              Date of Study:  2020    Height: 5' 4\" (162.6 cm)  Weight: 200 lb (90.7 kg)     Ordering Provider: Christa Sanders MD          PCP: Lisa Gavin DO      Cardiologist: Christa Sanders MD                        Interpreting Physician: Christa Sanders MD  _________________________________________________________________________________    Indication:   Chest Pain    Clinical History:   Patient has no known history of coronary artery disease. Resting ECG:    Sinus bradycardia    Exercise: The patient exercised using a Marlo protocol, completing 4:32 minutes and reaching an estimated work load of 4.85 metabolic equivalents (METS). Resting HR was 60. Peak exercise heart rate was 150 ( 94% of maximum predicted heart rate for age). Baseline /88. Peak exercise /71. The blood pressure response to exercise was normal      Exercise was terminated due to heart rate attained, fatigue, progressive dyspnea. The patient experienced no chest pain with exercise. Pulse oximetry was used to monitor oxygen saturation during the stress test.  The study was performed on Room Air. The resting pulse oximeter was 98%. The post stress O2 saturation seen during exercise was 97 %. Exercise ECG:   The patient demonstrated occasional PVC's during exercise. With exercise, there were no ST segment changes of significance at the heart rate achieved. IMAGING: Myocardial perfusion imaging was performed at rest 30-35 minutes following the intravenous injection of 9.9 mCi of (Tc-tetrofosmin) followed by 10 ml of Normal Saline.   At peak exercise, the patient was injected intravenously with 31.8 mCi of (Tc-tetrofosmin) followed by 10 ml of Normal Saline. Gated post-stress tomographic imaging was performed 20-25 minutes after stress. FINDINGS: The overall quality of the study was good. Left ventricular cavity size was noted to be normal.    Rotational analog analysis demonstrated soft tissue breast attenuation. The gated SPECT stress imaging in the short, vertical long, and horizontal long axis demonstrated very small, mild apical reversible defect most likely represents soft tissue attenuation artifact    Gated SPECT left ventricular ejection fraction was calculated to be 73%, with normal myocardial thickening and wall motion. Impression:    1. Exercise EKG was normal.  2. The patient experienced no chest pain with exercise. 3. The myocardial perfusion imaging was normal with attenuation artifact. 4. Overall left ventricular systolic function was normal without regional wall motion abnormalities. 5. Exercise capacity was below average. 6. Low risk general exercise stress test.    Thank you for sending your patient to this [employer].      Electronically signed by Esau Silva MD on 9/22/20 at 5:16 PM EDT

## 2020-09-28 ENCOUNTER — HOSPITAL ENCOUNTER (OUTPATIENT)
Age: 60
Discharge: HOME | End: 2020-09-28
Payer: COMMERCIAL

## 2020-09-28 LAB
HCT VFR BLD CALC: 36.6 % (ref 34–48)
HEMOGLOBIN: 11.8 G/DL (ref 11.5–15.5)
MCH RBC QN AUTO: 28.9 PG (ref 26–35)
MCHC RBC AUTO-ENTMCNC: 32.2 % (ref 32–34.5)
MCV RBC AUTO: 89.5 FL (ref 80–99.9)
PDW BLD-RTO: 15.3 FL (ref 11.5–15)
PLATELET # BLD: 187 E9/L (ref 130–450)
PMV BLD AUTO: 11.1 FL (ref 7–12)
RBC # BLD: 4.09 E12/L (ref 3.5–5.5)
WBC # BLD: 5.4 E9/L (ref 4.5–11.5)

## 2020-09-28 PROCEDURE — 85027 COMPLETE CBC AUTOMATED: CPT

## 2020-09-28 PROCEDURE — 36415 COLL VENOUS BLD VENIPUNCTURE: CPT

## 2021-01-20 DIAGNOSIS — U07.1 COVID-19 VIRUS INFECTION: Primary | ICD-10-CM

## 2021-01-21 ENCOUNTER — HOSPITAL ENCOUNTER (OUTPATIENT)
Age: 61
Discharge: HOME | End: 2021-01-21
Payer: COMMERCIAL

## 2021-01-21 LAB — SARS-COV-2 ANTIBODY, TOTAL: REACTIVE

## 2021-01-21 PROCEDURE — 86769 SARS-COV-2 COVID-19 ANTIBODY: CPT

## 2021-01-21 PROCEDURE — 36415 COLL VENOUS BLD VENIPUNCTURE: CPT

## 2021-02-19 ENCOUNTER — HOSPITAL ENCOUNTER (OUTPATIENT)
Age: 61
Discharge: HOME | End: 2021-02-19
Payer: COMMERCIAL

## 2021-02-19 DIAGNOSIS — R73.9 HYPERGLYCEMIA: ICD-10-CM

## 2021-02-19 DIAGNOSIS — D50.0 IRON DEFICIENCY ANEMIA SECONDARY TO BLOOD LOSS (CHRONIC): ICD-10-CM

## 2021-02-19 DIAGNOSIS — E03.9 PRIMARY HYPOTHYROIDISM: ICD-10-CM

## 2021-02-19 DIAGNOSIS — E78.01 FAMILIAL HYPERCHOLESTEROLEMIA: ICD-10-CM

## 2021-02-19 LAB
ALBUMIN SERPL-MCNC: 5.2 G/DL (ref 3.5–5.2)
ALP BLD-CCNC: 72 U/L (ref 35–104)
ALT SERPL-CCNC: 32 U/L (ref 0–32)
ANION GAP SERPL CALCULATED.3IONS-SCNC: 14 MMOL/L (ref 7–16)
AST SERPL-CCNC: 30 U/L (ref 0–31)
BASOPHILS ABSOLUTE: 0 E9/L (ref 0–0.2)
BASOPHILS RELATIVE PERCENT: 0 % (ref 0–2)
BILIRUB SERPL-MCNC: 0.5 MG/DL (ref 0–1.2)
BUN BLDV-MCNC: 26 MG/DL (ref 8–23)
CALCIUM SERPL-MCNC: 10.6 MG/DL (ref 8.6–10.2)
CHLORIDE BLD-SCNC: 97 MMOL/L (ref 98–107)
CHOLESTEROL, TOTAL: 181 MG/DL (ref 0–199)
CO2: 29 MMOL/L (ref 22–29)
CREAT SERPL-MCNC: 1.3 MG/DL (ref 0.5–1)
EOSINOPHILS ABSOLUTE: 0 E9/L (ref 0.05–0.5)
EOSINOPHILS RELATIVE PERCENT: 0 % (ref 0–6)
GFR AFRICAN AMERICAN: 50
GFR NON-AFRICAN AMERICAN: 50 ML/MIN/1.73
GLUCOSE BLD-MCNC: 115 MG/DL (ref 74–99)
HBA1C MFR BLD: 6.1 % (ref 4–5.6)
HCT VFR BLD CALC: 39.5 % (ref 34–48)
HDLC SERPL-MCNC: 60 MG/DL
HEMOGLOBIN: 13 G/DL (ref 11.5–15.5)
IMMATURE GRANULOCYTES #: 0.01 E9/L
IMMATURE GRANULOCYTES %: 0.2 % (ref 0–5)
LDL CHOLESTEROL CALCULATED: 104 MG/DL (ref 0–99)
LYMPHOCYTES ABSOLUTE: 2.26 E9/L (ref 1.5–4)
LYMPHOCYTES RELATIVE PERCENT: 45.3 % (ref 20–42)
MCH RBC QN AUTO: 28.9 PG (ref 26–35)
MCHC RBC AUTO-ENTMCNC: 32.9 % (ref 32–34.5)
MCV RBC AUTO: 87.8 FL (ref 80–99.9)
MONOCYTES ABSOLUTE: 0.41 E9/L (ref 0.1–0.95)
MONOCYTES RELATIVE PERCENT: 8.2 % (ref 2–12)
NEUTROPHILS ABSOLUTE: 2.31 E9/L (ref 1.8–7.3)
NEUTROPHILS RELATIVE PERCENT: 46.3 % (ref 43–80)
PDW BLD-RTO: 14.9 FL (ref 11.5–15)
PLATELET # BLD: 250 E9/L (ref 130–450)
PMV BLD AUTO: 11.2 FL (ref 7–12)
POTASSIUM SERPL-SCNC: 3.5 MMOL/L (ref 3.5–5)
RBC # BLD: 4.5 E12/L (ref 3.5–5.5)
SODIUM BLD-SCNC: 140 MMOL/L (ref 132–146)
TOTAL PROTEIN: 8.8 G/DL (ref 6.4–8.3)
TRIGL SERPL-MCNC: 85 MG/DL (ref 0–149)
TSH SERPL DL<=0.05 MIU/L-ACNC: 7.5 UIU/ML (ref 0.27–4.2)
VITAMIN D 25-HYDROXY: 36 NG/ML (ref 30–100)
VLDLC SERPL CALC-MCNC: 17 MG/DL
WBC # BLD: 5 E9/L (ref 4.5–11.5)

## 2021-02-19 PROCEDURE — 36415 COLL VENOUS BLD VENIPUNCTURE: CPT

## 2021-02-19 PROCEDURE — 80053 COMPREHEN METABOLIC PANEL: CPT

## 2021-02-19 PROCEDURE — 84443 ASSAY THYROID STIM HORMONE: CPT

## 2021-02-19 PROCEDURE — 83036 HEMOGLOBIN GLYCOSYLATED A1C: CPT

## 2021-02-19 PROCEDURE — 82306 VITAMIN D 25 HYDROXY: CPT

## 2021-02-19 PROCEDURE — 85025 COMPLETE CBC W/AUTO DIFF WBC: CPT

## 2021-02-19 PROCEDURE — 80061 LIPID PANEL: CPT

## 2021-03-31 ENCOUNTER — OFFICE VISIT (OUTPATIENT)
Dept: PHYSICAL MEDICINE AND REHAB | Age: 61
End: 2021-03-31
Payer: COMMERCIAL

## 2021-03-31 VITALS — HEIGHT: 65 IN | WEIGHT: 201 LBS | BODY MASS INDEX: 33.49 KG/M2

## 2021-03-31 DIAGNOSIS — G56.03 BILATERAL CARPAL TUNNEL SYNDROME: ICD-10-CM

## 2021-03-31 PROCEDURE — 95910 NRV CNDJ TEST 7-8 STUDIES: CPT | Performed by: PHYSICAL MEDICINE & REHABILITATION

## 2021-03-31 PROCEDURE — 95886 MUSC TEST DONE W/N TEST COMP: CPT | Performed by: PHYSICAL MEDICINE & REHABILITATION

## 2021-03-31 NOTE — PATIENT INSTRUCTIONS
Electrodiagnotic Laboratory  Accredited by the [facility] with Exemplary status  ESMER Hernandez D.O. [facility]  [address] [facility] [place]  Phone: [phone]  Fax: [phone]        Today you had an electrodiagnostic exam which included nerve conduction studies (NCS) and electromyography (EMG). This test evaluated the electrical activity of your nerves and muscles to help determine if you have a nerve or muscle disease. This test can help determine the location and type of a nerve or muscle problem. This will help your referring doctor diagnose your condition and determine the appropriate next step in your treatment plan. After your test:    1. There are no long lasting side effects of the test.     2. You may resume your normal activities without restrictions. 3.  Resume any medications that were stopped for the test.     4  If you have sore areas or bruising in your muscles where the needle was placed, apply a cold pack to the sore area for 15-20 minutes three to four times a day as needed for pain. The soreness should go away in about 1-2 days. 5. Your results were provided  Briefly at the end of your test and the final detailed report will be provided to your referring physician, and/or primary care physician and any other parties you requested within 1-2 days of the examination. You may wish to contact your referring provider after a few days to determine what they would like you to do next. 6.  Please call [phone] with any questions or concerns and if you develop increased body temperature/fever, swelling, tenderness, increased pain and/or drainage from the sites where the needle was placed. Thank you for choosing us for your health care needs.

## 2021-03-31 NOTE — PROGRESS NOTES
[address] [facility]  Electrodiagnostic Laboratory  *Accredited by the [address] [place] [address] with exemplary status  [address] [facility] [address]  Phone: [phone]  Fax: [phone]    Referring Provider: Pete Tompkins DO  Primary Care Physician: Da Lezama DO  Patient Name: Elisabeth Foley  Patient YOB: 1960  Gender: female  BMI: Body mass index is 33.45 kg/m². Height 5' 5\" (1.651 m), weight 201 lb (91.2 kg). 3/31/2021    Description of clinical problem:   Chief Complaint   Patient presents with    Extremity Pain     pain from hand to elbow. 9/10 pain. pain is a lot worse in the morning. R is worse. 2+ years just getting worse    Numbness     entire hand has numbness.  Extremity Weakness     due to numbness/pain there is some weakness. Sensory NCS      Nerve / Sites Rec. Site Peak Lat PP Amp Segments Distance Velocity Temp. ms µV  cm m/s °C   R Median - Digit II (Antidromic)      Palm Dig II 3.02 13.0 Palm - Dig II 7 33 32.2      Wrist Dig II 6.09* 12.1 Wrist - Dig II 14 32 32.4   L Median - Digit II (Antidromic)      Palm Dig II 2.60 19.2 Palm - Dig II 7 34 32      Wrist Dig II 5.78* 17.4 Wrist - Dig II 14 33 32   R Ulnar - Digit V (Antidromic)      Wrist Dig V 3.65 39.1 Wrist - Dig V 14 51 32.1   R Radial - Anatomical snuff box (Forearm)      Forearm Wrist 2.60 21.1 Forearm - Wrist 10 49 33.6       Motor NCS      Nerve / Sites Muscle Onset Amplitude Segments Distance Velocity Temp.     ms mV  cm m/s °C   R Median - APB      Palm APB 1.41 17.1 Palm - APB   32.7      Wrist APB 5.63* 14.6 Wrist - Palm 8 19* 33      Elbow APB 9.06 14.8 Elbow - Wrist 22 64 33   L Median - APB      Palm APB 2.03 15.1 Palm - APB   32      Wrist APB 5.26* 12.2 Wrist - Palm 8 25* 32.1      Elbow APB 8.54 12.2 Elbow - Wrist 21 64 32.1   R Ulnar - ADM      Wrist ADM 2.55 10.2 Wrist - ADM 8  33.9      B. Elbow ADM 5.99 7.8 B. Elbow - Wrist 20 58 32.7      A. Elbow ADM 7.92 7.0 A. Elbow - B.Elbow 10 52 32.5       F Wave      Nerve Fmin % F    ms %   R Median - APB 32.66* 100   R Ulnar - ADM 28.96 100   L Median - APB 32.03* 100       EMG      EMG Summary Table     Spontaneous MUAP Recruitment   Muscle Nerve Roots IA Fib PSW Fasc Amp Dur. PPP Pattern   R. Biceps brachii Musculocutaneous C5-C6 N None None None N N N N   R. Triceps brachii Radial C6-C8 N None None None N N N N   R. Pronator teres Median C6-C7 N None None None N N N N   R. First dorsal interosseous Ulnar C8-T1 N None None None N N N N   R. Abductor pollicis brevis Median T6-A3 N None None None N N N N   L. Biceps brachii Musculocutaneous C5-C6 N None None None N N N N   L. Triceps brachii Radial C6-C8 N None None None N N N N   L. Pronator teres Median C6-C7 N None None None N N N N   L. First dorsal interosseous Ulnar C8-T1 N None None None N N N N   L. Abductor pollicis brevis Median H9-H8 N None None None N N N N      Study Limitations:  none    Summary of Findings:   Nerve conduction studies:   · The following nerve conduction studies were abnormal:   · Bilateral median sensory latency at the wrist was prolonged. · Bilateral median motor latency at the wrist was prolonged with focal slowing across the wrist. Bilateral median minimal F wave was prolonged. · All other nerve conduction studies listed in the table above were normal in latency, amplitude and conduction velocity. Needle EMG:   · Needle EMG was performed using a concentric needle.  All muscles tested, as listed in the table above demonstrated normal amplitude, duration, phases and recruitment and no active denervation signs were seen. Diagnostic Interpretation: This study was abnormal.     Electrodiagnosis: There is electrodiagnostic evidence of a median mononeuropathy.    · Location: bilateral at the wrist.   · Nature: [  ] Axonal   [ X ] Demyelinating  [  ] Mixed axonal and demyelinating     [  ] Sensory [  ] Motor               [ X ] Mixed sensorimotor     [  ] with active denervation       Maybelle.Million ] without active denervation  · Duration: Acute  · Severity: moderate  · Prognosis: Good. The prognosis for recovery of demyelinating lesions is good if the cause is alleviated. Previous Study: no      Follow up EMG is recommended if no surgical intervention and symptoms persist in one year. Technologist: Ruby Song  Physician:    Tonya Irene D.O., P.T. Board Certified Physical Medicine and Rehabilitation  Board Certified Electrodiagnostic Medicine      Nerve conduction studies and electromyography were performed according to our laboratory policies and procedures which can be provided upon request. All abnormal values are identified in the table.  Laboratory normal values can also be provided upon request.       Cc: DO Chelo Burgess DO

## 2021-08-23 ENCOUNTER — OFFICE VISIT (OUTPATIENT)
Dept: ORTHOPEDIC SURGERY | Age: 61
End: 2021-08-23
Payer: COMMERCIAL

## 2021-08-23 VITALS — BODY MASS INDEX: 36.65 KG/M2 | WEIGHT: 220 LBS | HEIGHT: 65 IN | TEMPERATURE: 98 F

## 2021-08-23 DIAGNOSIS — G56.02 LEFT CARPAL TUNNEL SYNDROME: ICD-10-CM

## 2021-08-23 DIAGNOSIS — G56.01 RIGHT CARPAL TUNNEL SYNDROME: Primary | ICD-10-CM

## 2021-08-23 PROCEDURE — G8427 DOCREV CUR MEDS BY ELIG CLIN: HCPCS | Performed by: ORTHOPAEDIC SURGERY

## 2021-08-23 PROCEDURE — G8417 CALC BMI ABV UP PARAM F/U: HCPCS | Performed by: ORTHOPAEDIC SURGERY

## 2021-08-23 PROCEDURE — 3017F COLORECTAL CA SCREEN DOC REV: CPT | Performed by: ORTHOPAEDIC SURGERY

## 2021-08-23 PROCEDURE — 99204 OFFICE O/P NEW MOD 45 MIN: CPT | Performed by: ORTHOPAEDIC SURGERY

## 2021-08-23 PROCEDURE — 1036F TOBACCO NON-USER: CPT | Performed by: ORTHOPAEDIC SURGERY

## 2021-08-23 NOTE — PROGRESS NOTES
Chief Complaint   Patient presents with    Carpal Tunnel     b/l carpal tunnel had EMG 3/31/21. Maru Campa is a 59y.o. year old   @female@ who presents for evaluation of bilateral wrist pain. she reports this started several years ago. she does not remember a specific injury that started the pain. .  The injury was none. The pain is located mainly in the right palm, right thumb, index and middle fingers. The pain is worse with repetitive activities and better with rest.  The patient has tried splints,  OTC analgesics, ice, avoidance of offending activity. The treatment has not been effective. The patient is Right hand dominant. Patient's occupation is a at [employer]. Past Medical History:   Diagnosis Date    H/O cardiovascular stress test 01/11/2017    Lexiscan    HTN (hypertension)     Hypothyroid     Organic insomnia     Persistent disorder of initiating or maintaining sleep     Sarcoidosis      History reviewed. No pertinent surgical history.     Current Outpatient Medications:     amLODIPine (NORVASC) 5 MG tablet, TAKE 1 TABLET BY MOUTH ONE TIME A DAY, Disp: 90 tablet, Rfl: 0    ergocalciferol (ERGOCALCIFEROL) 1.25 MG (10678 UT) capsule, weekly, Disp: 12 capsule, Rfl: 0    levothyroxine (SYNTHROID) 50 MCG tablet, Take 1 tablet by mouth daily, Disp: 90 tablet, Rfl: 0    SYNTHROID 25 MCG tablet, Take 1 tablet by mouth Daily, Disp: 90 tablet, Rfl: 1    olmesartan-hydroCHLOROthiazide (BENICAR HCT) 40-25 MG per tablet, Take 1 tablet by mouth daily, Disp: 90 tablet, Rfl: 1    albuterol sulfate HFA (PROVENTIL HFA) 108 (90 Base) MCG/ACT inhaler, Inhale 2 puffs into the lungs 4 times daily as needed for Wheezing, Disp: 1 Inhaler, Rfl: 0    levothyroxine (SYNTHROID) 25 MCG tablet, Take 1 tablet by mouth daily, Disp: 30 tablet, Rfl: 5  Allergies   Allergen Reactions    Doxycycline Hives     Social History     Socioeconomic History    Marital status:  Spouse name: Not on file    Number of children: Not on file    Years of education: Not on file    Highest education level: Not on file   Occupational History    Not on file   Tobacco Use    Smoking status: Former Smoker     Packs/day: 0.25     Years: 15.00     Pack years: 3.75     Quit date: 2000     Years since quittin.9    Smokeless tobacco: Never Used   Vaping Use    Vaping Use: Never used   Substance and Sexual Activity    Alcohol use: Yes     Comment: socially. No coffee for a week     Drug use: No    Sexual activity: Yes     Partners: Male   Other Topics Concern    Not on file   Social History Narrative    Not on file     Social Determinants of Health     Financial Resource Strain: Low Risk     Difficulty of Paying Living Expenses: Not hard at all   Food Insecurity: No Food Insecurity    Worried About Running Out of Food in the Last Year: Never true    [address] in the Last Year: Never true   Transportation Needs:     Lack of Transportation (Medical):  Lack of Transportation (Non-Medical):    Physical Activity:     Days of Exercise per Week:     Minutes of Exercise per Session:    Stress:     Feeling of Stress :    Social Connections:     Frequency of Communication with Friends and Family:     Frequency of Social Gatherings with Friends and Family:     Attends Quaker Services:     Active Member of Clubs or Organizations:     Attends Club or Organization Meetings:     Marital Status:    Intimate Partner Violence:     Fear of Current or Ex-Partner:     Emotionally Abused:     Physically Abused:     Sexually Abused:      Family History   Problem Relation Age of Onset    Kidney Disease Mother     Cancer Father        REVIEW OF SYSTEMS:     General/Constitution:  (-)weight loss, (-)fever, (-)chills, (-)weakness. Skin: (-) rash,(-) psoriasis,(-) eczema, (-)skin cancer.    Musculoskeletal: (-) fractures,  (-) dislocations,(-) collagen vascular disease, (-) is no evidence of deformity of the wrist.  ROM Wrist ROM R wrist DF 90, VF 90, L wrist DF 90, VF 90, R pronation 90/ supination 90, L pronation 90/supination 90. Motor strength is 5/5 with Dorsiflexion/Volarflexion/Supination/Pronation.  strength 4/5. Thenar eminence appears bilaterally symmetrical.   Motor and sensation is intact and symmetric throughout the bilateral upper extremities in the  ulnar and radial , musclcutaneous, and axillary nerve distributions. There is paresthesia in the median nerve distribution of the right hand. Hand exam:    The skin overlying the hand is  intact. There is not evidence of scar, lesion, laceration, or abrasion. The motion in the small joints of the hand are intact with no stiffness or deformity. The ROM in the MCP flexion 90/ extension 0 , PIP flexion 90/ extension 0, DIP flexion 70/ extension 0. There is not rotational deformity. There is no masses or adenopathy in bilateral upper extremities. Radial pulses are 2+ and symmetric bilaterally. Capillary refill is intact and < 2 seconds. Motor strength is 5/5 with flexion and extension of the small finger joints. Right:  Phallens sign(+), Tinnells sign (+), Median nerve compression test (+),  Finklesteins (-), [employer] Grind test (-), [employer](-). Left:  Phallens sign(+), Tinnells sign (+), Median nerve compression test (+),  Finklesteins (-), [employer] Grind test (-), [employer](-). X-rays:  n/a    EMG:  This study was abnormal.       Electrodiagnosis: There is electrodiagnostic evidence of a median mononeuropathy. \" Location: bilateral at the wrist.   \" Nature: [  ] Axonal   [ X ] Demyelinating  [  ] Mixed axonal and demyelinating                      [  ] Sensory [  ] Motor               [ X ] Mixed sensorimotor                      [  ] with active denervation       Severianus.Yuma ] without active denervation   \" Duration: Acute   \" Severity: moderate   \" Prognosis: Good.  The prognosis for recovery of demyelinating lesions is good if the cause is alleviated.       Previous Study: no         Follow up EMG is recommended if no surgical intervention and symptoms persist in one year      Impression:   Encounter Diagnoses   Name Primary?  Right carpal tunnel syndrome Yes    Left carpal tunnel syndrome        Plan: Natural history and expected course discussed. Questions answered. Educational materials distributed. Rest, ice, compression, and elevation (RICE) therapy. Reduction in offending activity discussed. Volar wrist splint to be worn at night. I had a lengthy discussion with the patient regarding their diagnosis. I explained treatment options including surgical vs non surgical treatment. I reviewed in detail the risks and benefits and outlined the procedure in detail with expected outcomes and possible complications. I also discussed non surgical treatment such as injections (CSI), physical therapy, topical creams and NSAID's. They have elected for surgicval management at this time. We will perform a Right carpal tunnel release , she will callw ith a date. The risks and benefits were reviewed with the patient such as:  DVT, infection,  injuries to blood vessels and nerves, non relief of symptoms, continued pain, worsening of symptoms. The patient was counseled at length about the risks of kallie Covid-19 during their perioperative period and any recovery window from their procedure. The patient was made aware that kallie Covid-19  may worsen their prognosis for recovering from their procedure  and lend to a higher morbidity and/or mortality risk. All material risks, benefits, and reasonable alternatives including postponing the procedure were discussed. The patient does wish to proceed with the procedure at this time.   At least 45 minutes was spent discussing the diagnosis and treatment options with the patient with at least 50% of the time was spent with decision making and counseling the patient.

## 2022-09-24 ENCOUNTER — HOSPITAL ENCOUNTER (OUTPATIENT)
Dept: MAMMOGRAPHY | Age: 62
Discharge: HOME | End: 2022-09-26
Payer: COMMERCIAL

## 2022-09-24 DIAGNOSIS — Z12.31 SCREENING MAMMOGRAM FOR HIGH-RISK PATIENT: ICD-10-CM

## 2022-09-24 PROCEDURE — 77063 BREAST TOMOSYNTHESIS BI: CPT

## 2022-12-20 ENCOUNTER — HOSPITAL ENCOUNTER (OUTPATIENT)
Age: 62
Discharge: HOME | End: 2022-12-20
Payer: COMMERCIAL

## 2022-12-20 DIAGNOSIS — R73.9 HYPERGLYCEMIA: ICD-10-CM

## 2022-12-20 DIAGNOSIS — E55.9 VITAMIN D DEFICIENCY: ICD-10-CM

## 2022-12-20 DIAGNOSIS — E78.01 FAMILIAL HYPERCHOLESTEROLEMIA: ICD-10-CM

## 2022-12-20 LAB
ALBUMIN SERPL-MCNC: 4.6 G/DL (ref 3.5–5.2)
ALP BLD-CCNC: 71 U/L (ref 35–104)
ALT SERPL-CCNC: 36 U/L (ref 0–32)
ANION GAP SERPL CALCULATED.3IONS-SCNC: 14 MMOL/L (ref 7–16)
AST SERPL-CCNC: 29 U/L (ref 0–31)
BILIRUB SERPL-MCNC: 0.7 MG/DL (ref 0–1.2)
BUN BLDV-MCNC: 14 MG/DL (ref 6–23)
CALCIUM SERPL-MCNC: 9.8 MG/DL (ref 8.6–10.2)
CHLORIDE BLD-SCNC: 99 MMOL/L (ref 98–107)
CHOLESTEROL, TOTAL: 217 MG/DL (ref 0–199)
CO2: 25 MMOL/L (ref 22–29)
CREAT SERPL-MCNC: 1 MG/DL (ref 0.5–1)
GFR SERPL CREATININE-BSD FRML MDRD: >60 ML/MIN/1.73
GLUCOSE BLD-MCNC: 100 MG/DL (ref 74–99)
HBA1C MFR BLD: 6 % (ref 4–5.6)
HDLC SERPL-MCNC: 64 MG/DL
LDL CHOLESTEROL CALCULATED: 125 MG/DL (ref 0–99)
POTASSIUM SERPL-SCNC: 3.3 MMOL/L (ref 3.5–5)
SODIUM BLD-SCNC: 138 MMOL/L (ref 132–146)
TOTAL PROTEIN: 7.8 G/DL (ref 6.4–8.3)
TRIGL SERPL-MCNC: 138 MG/DL (ref 0–149)
VITAMIN D 25-HYDROXY: 34 NG/ML (ref 30–100)
VLDLC SERPL CALC-MCNC: 28 MG/DL

## 2022-12-20 PROCEDURE — 80053 COMPREHEN METABOLIC PANEL: CPT

## 2022-12-20 PROCEDURE — 36415 COLL VENOUS BLD VENIPUNCTURE: CPT

## 2022-12-20 PROCEDURE — 80061 LIPID PANEL: CPT

## 2022-12-20 PROCEDURE — 82306 VITAMIN D 25 HYDROXY: CPT

## 2022-12-20 PROCEDURE — 83036 HEMOGLOBIN GLYCOSYLATED A1C: CPT

## 2022-12-27 ENCOUNTER — OFFICE VISIT (OUTPATIENT)
Dept: ORTHOPEDIC SURGERY | Age: 62
End: 2022-12-27
Payer: COMMERCIAL

## 2022-12-27 VITALS — WEIGHT: 230 LBS | HEIGHT: 65 IN | BODY MASS INDEX: 38.32 KG/M2 | TEMPERATURE: 98 F

## 2022-12-27 DIAGNOSIS — G56.01 RIGHT CARPAL TUNNEL SYNDROME: Primary | ICD-10-CM

## 2022-12-27 DIAGNOSIS — G56.02 LEFT CARPAL TUNNEL SYNDROME: ICD-10-CM

## 2022-12-27 PROCEDURE — 3017F COLORECTAL CA SCREEN DOC REV: CPT | Performed by: ORTHOPAEDIC SURGERY

## 2022-12-27 PROCEDURE — 1036F TOBACCO NON-USER: CPT | Performed by: ORTHOPAEDIC SURGERY

## 2022-12-27 PROCEDURE — G8427 DOCREV CUR MEDS BY ELIG CLIN: HCPCS | Performed by: ORTHOPAEDIC SURGERY

## 2022-12-27 PROCEDURE — G8417 CALC BMI ABV UP PARAM F/U: HCPCS | Performed by: ORTHOPAEDIC SURGERY

## 2022-12-27 PROCEDURE — G8484 FLU IMMUNIZE NO ADMIN: HCPCS | Performed by: ORTHOPAEDIC SURGERY

## 2022-12-27 PROCEDURE — 99214 OFFICE O/P EST MOD 30 MIN: CPT | Performed by: ORTHOPAEDIC SURGERY

## 2022-12-27 NOTE — PROGRESS NOTES
Chief Complaint   Patient presents with    Carpal Tunnel     Bilateral Hand F/U, R>L        Gabriela Kennedy is a 57y.o. year old   @female@ who presents today for a carpal tunnel follow up. R>L    Past Medical History:   Diagnosis Date    H/O cardiovascular stress test 2017    Lexiscan    HTN (hypertension)     Hypothyroid     Organic insomnia     Persistent disorder of initiating or maintaining sleep     Sarcoidosis      No past surgical history on file. Current Outpatient Medications:     potassium chloride (K-TAB) 10 MEQ extended release tablet, Take 1 tablet by mouth daily, Disp: 30 tablet, Rfl: 0    amLODIPine (NORVASC) 5 MG tablet, TAKE 1 TABLET BY MOUTH ONE TIME A DAY, Disp: 90 tablet, Rfl: 1    ergocalciferol (ERGOCALCIFEROL) 1.25 MG (10034 UT) capsule, weekly, Disp: 12 capsule, Rfl: 1    levothyroxine (SYNTHROID) 50 MCG tablet, Take 1 tablet by mouth daily, Disp: 90 tablet, Rfl: 1    olmesartan-hydroCHLOROthiazide (BENICAR HCT) 40-25 MG per tablet, Take 1 tablet by mouth daily, Disp: 90 tablet, Rfl: 1    zolpidem (AMBIEN) 10 MG tablet, Take 1 tablet by mouth nightly as needed for Sleep for up to 90 days. , Disp: 90 tablet, Rfl: 0    albuterol sulfate HFA (PROVENTIL HFA) 108 (90 Base) MCG/ACT inhaler, Inhale 2 puffs into the lungs 4 times daily as needed for Wheezing, Disp: 1 each, Rfl: 2  Allergies   Allergen Reactions    Doxycycline Hives     Social History     Socioeconomic History    Marital status:      Spouse name: Not on file    Number of children: Not on file    Years of education: Not on file    Highest education level: Not on file   Occupational History    Not on file   Tobacco Use    Smoking status: Former     Packs/day: 0.25     Years: 15.00     Pack years: 3.75     Types: Cigarettes     Quit date: 2000     Years since quittin.2    Smokeless tobacco: Never   Vaping Use    Vaping Use: Never used   Substance and Sexual Activity    Alcohol use:  Yes Comment: socially. No coffee for a week     Drug use: No    Sexual activity: Yes     Partners: Male   Other Topics Concern    Not on file   Social History Narrative    Not on file     Social Determinants of Health     Financial Resource Strain: Low Risk     Difficulty of Paying Living Expenses: Not hard at all   Food Insecurity: No Food Insecurity    Worried About Running Out of Food in the Last Year: Never true    Ran Out of Food in the Last Year: Never true   Transportation Needs: Not on file   Physical Activity: Not on file   Stress: Not on file   Social Connections: Not on file   Intimate Partner Violence: Not on file   Housing Stability: Not on file     Family History   Problem Relation Age of Onset    Kidney Disease Mother     Breast Cancer Mother 48    Cancer Father        REVIEW OF SYSTEMS:     General/Constitution:  (-)weight loss, (-)fever, (-)chills, (-)weakness. Skin: (-) rash,(-) psoriasis,(-) eczema, (-)skin cancer. Musculoskeletal: (-) fractures,  (-) dislocations,(-) collagen vascular disease, (-) fibromyalgia, (-) multiple sclerosis, (-) muscular dystrophy, (-) RSD,(-) joint pain (-)swelling, (-) joint pain,swelling. Neurologic: (-) epilepsy, (-)seizures,(-) brain tumor,(-) TIA, (-)stroke, (-)headaches, (-)Parkinson disease,(-) memory loss, (-) LOC. Cardiovascular: (-) Chest pain, (-) swelling in legs/feet, (-) SOB, (-) cramping in legs/feet with walking. Respiratory: (-) SOB, (-) Coughing, (-) night sweats. GI: (-) nausea, (-) vomiting, (-) diarrhea, (-) blood in stool, (-) gastric ulcer. Psychiatric: (-) Depression, (-) Anxiety, (-) bipolar disease, (-) Alzheimer's Disease  Allergic/Immunologic: (-) allergies latex, (-) allergies metal, (-) skin sensitivity. Hematlogic: (-) anemia, (-) blood transfusion, (-) DVT/PE, (-) Clotting disorders      SUBJECTIVE:    _Temp 98 °F (36.7 °C)   Ht 5' 5\" (1.651 m)   Wt 230 lb (104.3 kg)   BMI 38.27 kg/m²  Vital signs are stable.   In general, patient is awake, alert and oriented X3, in no apparent distress. Examination of HENT reveals normocephalic, atraumatic. PERRLA/EOMI sclera are white. Conjunctivae are clear. TM's are intact. Pharynx is pink and moist.  Uvula and tongue are midline. Heart: Positive S1 and positive S2 with regular rate and rhythm. Lungs: Clear to auscultation bilaterally without rales, rhonchi or wheezes. Abdomen: soft, nontender. Positive bowel sounds. No organomegaly. No guarding or rigidity. Constitution:    Temp 98 °F (36.7 °C)   Ht 5' 5\" (1.651 m)   Wt 230 lb (104.3 kg)   BMI 38.27 kg/m²     Psycihatric:    The patient is alert and oriented x 3, appears to be stated age and in no distress. Respiratory:    Respiratory effort is not labored. Patient is not gasping. Palpation of the chest reveals no tactile fremitus. Skin:    Upon inspection: the skin appears warm, dry and intact. There is not a previous scar over the affected area. There is not any cellulitis, lymphedema or cutaneous lesions noted in the lower extremities. Upon palpation there is no induration noted. Neurologic:    Gait: normal;  Motor exam of the upper extremities show: The reflexes in biceps/triceps/brachioradialis are equal and symmetric. Sensory exam C5-T1 are normal bilaterally. Cardiovascular: The vascular exam is normal and is well perfused to distal extremities. There are 2+ radial pulses bilaterally, and motor and sensation is intact to median, ulnar, and radial, musclocutaneus, and axillary nerve distribution and grossly symmetric bilaterally. There is cap refill noted less than two seconds in all digits. There is not edema of the bilateral upper extremities. There is not varicosities noted in the distal extremities. Lymph:    Upon palpation,  there is no lymphadenopathy noted in bilateral upper extremities.       Musculoskeletal:    Cervical Exam:    On physical exam, Breanne Martinez is well-developed, well-nourished, oriented to person, place and time. her gait is normal.  On evaluation of her cervical spine, she has full range of motion of the cervical spine without pain. There is no cervical tenderness to palpation. Shoulder Exam:    On evaluation of her bilaterally upper extremities, her bilateral shoulder has no deformity. There is not evidence of scapular dyskinesis. There is not muscle atrophy in shoulder girdle. The range of motion for the Right Shoulder is 150/45/t10 and for the Left shoulder is 150/45/t10. Right shoulder Motor strength is 5/5 in the supraspinatus, 5/5 internal rotation and 5/5 in external rotation, and Left shoulder motor strength 5/5 in supraspinatus, 5/5 in internal rotation, 5/5 in external rotation. Elbow exam:    Evaluation of the elbow, reveals no signs of swelling or deformity. ROM is 0-140. There is not instability with varus/valgus stresses. Motor strength is 5/5 with flexion/extension. Wrist exam:       Inspection of the bilateral upper extremities, there is no evidence of deformity of the wrist.  ROM Wrist ROM R wrist DF 90, VF 90, L wrist DF 90, VF 90, R pronation 90/ supination 90, L pronation 90/supination 90. Motor strength is 5/5 with Dorsiflexion/Volarflexion/Supination/Pronation.  strength 4/5. Thenar eminence appears bilaterally symmetrical.   Motor and sensation is intact and symmetric throughout the bilateral upper extremities in the  ulnar and radial , musclcutaneous, and axillary nerve distributions. There is paresthesia in the median nerve distribution of the right hand. Hand exam:    The skin overlying the hand is  intact. There is not evidence of scar, lesion, laceration, or abrasion. The motion in the small joints of the hand are intact with no stiffness or deformity. The ROM in the MCP flexion 90/ extension 0 , PIP flexion 90/ extension 0, DIP flexion 70/ extension 0. There is not rotational deformity. There is no masses or adenopathy in bilateral upper extremities. Radial pulses are 2+ and symmetric bilaterally. Capillary refill is intact and < 2 seconds. Motor strength is 5/5 with flexion and extension of the small finger joints. Right:  Phallens sign(+), Tinnells sign (+), Median nerve compression test (+),  Finklesteins (-), [employer] Grind test (-), [employer](-). Left:  Phallens sign(+), Tinnells sign (+), Median nerve compression test (+),  Finklesteins (-), [employer] Grind test (-), [employer](-). X-rays:  n/a    EMG:  This study was abnormal.       Electrodiagnosis: There is electrodiagnostic evidence of a median mononeuropathy. \" Location: bilateral at the wrist.   \" Nature: [  ] Axonal   [ X ] Demyelinating  [  ] Mixed axonal and demyelinating                      [  ] Sensory [  ] Motor               [ X ] Mixed sensorimotor                      [  ] with active denervation       Meggett.Challenger ] without active denervation   \" Duration: Acute   \" Severity: moderate   \" Prognosis: Good. The prognosis for recovery of demyelinating lesions is good if the cause is alleviated. Previous Study: no         Follow up EMG is recommended if no surgical intervention and symptoms persist in one year      Impression:   Encounter Diagnoses   Name Primary? Right carpal tunnel syndrome Yes    Left carpal tunnel syndrome          Plan: Natural history and expected course discussed. Questions answered. Educational materials distributed. Rest, ice, compression, and elevation (RICE) therapy. Reduction in offending activity discussed. Volar wrist splint to be worn at night. I had a lengthy discussion with the patient regarding their diagnosis. I explained treatment options including surgical vs non surgical treatment. I reviewed in detail the risks and benefits and outlined the procedure in detail with expected outcomes and possible complications.   I also discussed non surgical treatment such as injections

## 2023-01-04 ENCOUNTER — TELEPHONE (OUTPATIENT)
Dept: ORTHOPEDIC SURGERY | Age: 63
End: 2023-01-04

## 2023-01-04 ENCOUNTER — PREP FOR PROCEDURE (OUTPATIENT)
Dept: ORTHOPEDIC SURGERY | Age: 63
End: 2023-01-04

## 2023-01-04 NOTE — TELEPHONE ENCOUNTER
Prior Authorization Form:      DEMOGRAPHICS:                     Patient Name:  Remy Keller  Patient :  1960            Insurance:  Payor: Tyler Arteaga / [facility] [address] / Product Type: *No Product type* /   Insurance ID Number:    Payer/Plan Subscr  Sex Relation Sub. Ins. ID Effective Group Num   1. [facility] [facility] 1960 Female Self [id number] 22 [id number]                                   [address]   2.  Allan Yousif 1961 Male Spouse [id number]                                   [address]         DIAGNOSIS & PROCEDURE:                       Procedure/Operation: RIGHT WRIST CARPAL TUNNEL RELEASE           CPT Code: 92166    Diagnosis:  RIGHT WRIST CARPAL TUNNEL    ICD10 Code: 56.01    Location:  [facility]    Surgeon:  SENDY LEE    SCHEDULING INFORMATION:                          Date: 23    Time: TO FOLLOW              Anesthesia:  Gabriele Block                                                       Status:  Outpatient        Special Comments: NONE         Electronically signed by Katrina Oneal ATC on 2023 at 1:55 PM

## 2023-01-11 DIAGNOSIS — G56.01 RIGHT CARPAL TUNNEL SYNDROME: ICD-10-CM

## 2023-01-11 LAB
ANION GAP SERPL CALCULATED.3IONS-SCNC: 12 MMOL/L (ref 7–16)
BUN BLDV-MCNC: 16 MG/DL (ref 6–23)
CALCIUM SERPL-MCNC: 10.1 MG/DL (ref 8.6–10.2)
CHLORIDE BLD-SCNC: 101 MMOL/L (ref 98–107)
CO2: 27 MMOL/L (ref 22–29)
CREAT SERPL-MCNC: 1.1 MG/DL (ref 0.5–1)
GFR SERPL CREATININE-BSD FRML MDRD: 56 ML/MIN/1.73
GLUCOSE BLD-MCNC: 97 MG/DL (ref 74–99)
POTASSIUM SERPL-SCNC: 4.1 MMOL/L (ref 3.5–5)
SODIUM BLD-SCNC: 140 MMOL/L (ref 132–146)

## 2023-01-12 ENCOUNTER — ANESTHESIA EVENT (OUTPATIENT)
Dept: OPERATING ROOM | Age: 63
End: 2023-01-12
Payer: COMMERCIAL

## 2023-01-13 ENCOUNTER — HOSPITAL ENCOUNTER (OUTPATIENT)
Age: 63
Discharge: HOME | End: 2023-01-13
Payer: COMMERCIAL

## 2023-01-13 LAB
EKG ATRIAL RATE: 64 BPM
EKG P AXIS: 48 DEGREES
EKG P-R INTERVAL: 166 MS
EKG Q-T INTERVAL: 394 MS
EKG QRS DURATION: 88 MS
EKG QTC CALCULATION (BAZETT): 406 MS
EKG R AXIS: -14 DEGREES
EKG T AXIS: 35 DEGREES
EKG VENTRICULAR RATE: 64 BPM

## 2023-01-13 PROCEDURE — 93005 ELECTROCARDIOGRAM TRACING: CPT | Performed by: INTERNAL MEDICINE

## 2023-01-16 ASSESSMENT — LIFESTYLE VARIABLES: SMOKING_STATUS: 0

## 2023-01-16 NOTE — ANESTHESIA PRE PROCEDURE
Department of Anesthesiology  Preprocedure Note       Name:  Yuki Hooker   Age:  57 y.o.  :  1960                                          MRN:  [id number]         Date:  2023      Surgeon: Tae Paulson): Peggy Son, DO    Procedure: Procedure(s):  RIGHT WRIST CARPAL TUNNEL RELEASE- 23    Medications prior to admission:   Prior to Admission medications    Medication Sig Start Date End Date Taking? Authorizing Provider   potassium chloride (K-TAB) 10 MEQ extended release tablet Take 1 tablet by mouth daily 22   Venu Olivares,    amLODIPine (NORVASC) 5 MG tablet TAKE 1 TABLET BY MOUTH ONE TIME A DAY  Patient taking differently: Take 5 mg by mouth daily AM 22   Venu Olivares, DO   ergocalciferol (ERGOCALCIFEROL) 1.25 MG (25216 UT) capsule weekly 22   Venu Olivares, DO   levothyroxine (SYNTHROID) 50 MCG tablet Take 1 tablet by mouth daily 22   Venu Olivares, DO   olmesartan-hydroCHLOROthiazide (BENICAR HCT) 40-25 MG per tablet Take 1 tablet by mouth daily 22   Venu Olivares, DO   zolpidem (AMBIEN) 10 MG tablet Take 1 tablet by mouth nightly as needed for Sleep for up to 90 days. 22  Venu Olivares DO   albuterol sulfate HFA (PROVENTIL HFA) 108 (90 Base) MCG/ACT inhaler Inhale 2 puffs into the lungs 4 times daily as needed for Wheezing 3/18/22   Venu Olivares DO       Current medications:    No current facility-administered medications for this encounter.      Current Outpatient Medications   Medication Sig Dispense Refill    potassium chloride (K-TAB) 10 MEQ extended release tablet Take 1 tablet by mouth daily 30 tablet 0    amLODIPine (NORVASC) 5 MG tablet TAKE 1 TABLET BY MOUTH ONE TIME A DAY (Patient taking differently: Take 5 mg by mouth daily AM) 90 tablet 1    ergocalciferol (ERGOCALCIFEROL) 1.25 MG (22280 UT) capsule weekly 12 capsule 1    levothyroxine (SYNTHROID) 50 MCG tablet Take 1 tablet by mouth daily 90 tablet 1  olmesartan-hydroCHLOROthiazide (BENICAR HCT) 40-25 MG per tablet Take 1 tablet by mouth daily 90 tablet 1    zolpidem (AMBIEN) 10 MG tablet Take 1 tablet by mouth nightly as needed for Sleep for up to 90 days. 90 tablet 0    albuterol sulfate HFA (PROVENTIL HFA) 108 (90 Base) MCG/ACT inhaler Inhale 2 puffs into the lungs 4 times daily as needed for Wheezing 1 each 2       Allergies:     Allergies   Allergen Reactions    Doxycycline Hives       Problem List:    Patient Active Problem List   Diagnosis Code    Primary insomnia F51.01    Vitamin D deficiency E55.9    Hypothyroidism E03.9    Essential hypertension I10    Acute pain of left knee M25.562    Right carpal tunnel syndrome G56.01    Left carpal tunnel syndrome G56.02       Past Medical History:        Diagnosis Date    COVID-19 10/2021    muscle aches coughing HA    H/O cardiovascular stress test 2017    Lexiscan    neg per pt    HTN (hypertension)     Hyperlipidemia     Hypothyroid     Organic insomnia     PONV (postoperative nausea and vomiting)     Sarcoidosis     bilat lungs  (remission x 15 yrs)       Past Surgical History:        Procedure Laterality Date    BRONCHOSCOPY      done for DX of sarcoidosis    CHOLECYSTECTOMY      HYSTERECTOMY (CERVIX STATUS UNKNOWN)         Social History:    Social History     Tobacco Use    Smoking status: Former     Packs/day: 0.50     Years: 15.00     Pack years: 7.50     Types: Cigarettes     Quit date: 2000     Years since quittin.3    Smokeless tobacco: Never   Substance Use Topics    Alcohol use: Yes     Comment: occas wine                                Counseling given: Not Answered      Vital Signs (Current):   Vitals:    23 1505   Weight: 225 lb (102.1 kg)   Height: 5' 4\" (1.626 m)                                              BP Readings from Last 3 Encounters:   23 136/82   22 130/88   22 (!) 122/90       NPO Status:  >8.H BMI:   Wt Readings from Last 3 Encounters:   01/11/23 228 lb 11.2 oz (103.7 kg)   12/27/22 230 lb (104.3 kg)   11/18/22 230 lb 1.6 oz (104.4 kg)     Body mass index is 38.62 kg/m². CBC:   Lab Results   Component Value Date/Time    WBC 5.0 02/19/2021 01:08 PM    RBC 4.50 02/19/2021 01:08 PM    HGB 13.0 02/19/2021 01:08 PM    HCT 39.5 02/19/2021 01:08 PM    MCV 87.8 02/19/2021 01:08 PM    RDW 14.9 02/19/2021 01:08 PM     02/19/2021 01:08 PM       CMP:   Lab Results   Component Value Date/Time     01/11/2023 02:59 PM    K 4.1 01/11/2023 02:59 PM     01/11/2023 02:59 PM    CO2 27 01/11/2023 02:59 PM    BUN 16 01/11/2023 02:59 PM    CREATININE 1.1 01/11/2023 02:59 PM    GFRAA 50 02/19/2021 01:08 PM    LABGLOM 56 01/11/2023 02:59 PM    GLUCOSE 97 01/11/2023 02:59 PM    GLUCOSE 100 05/11/2011 12:28 PM    PROT 7.8 12/20/2022 11:56 AM    CALCIUM 10.1 01/11/2023 02:59 PM    BILITOT 0.7 12/20/2022 11:56 AM    ALKPHOS 71 12/20/2022 11:56 AM    AST 29 12/20/2022 11:56 AM    ALT 36 12/20/2022 11:56 AM       POC Tests: No results for input(s): POCGLU, POCNA, POCK, POCCL, POCBUN, POCHEMO, POCHCT in the last 72 hours. Coags: No results found for: PROTIME, INR, APTT    HCG (If Applicable): No results found for: PREGTESTUR, PREGSERUM, HCG, HCGQUANT     ABGs: No results found for: PHART, PO2ART, NBK9BVO, WHW7JDN, BEART, P5ZPJGHL     Type & Screen (If Applicable):  No results found for: LABABO, LABRH    Drug/Infectious Status (If Applicable):  No results found for: HIV, HEPCAB    COVID-19 Screening (If Applicable):   Lab Results   Component Value Date/Time    COVID19 Reactive 01/21/2021 02:45 PM           Anesthesia Evaluation  Patient summary reviewed and Nursing notes reviewed   history of anesthetic complications: PONV.   Airway: Mallampati: II  TM distance: >3 FB   Neck ROM: full  Mouth opening: > = 3 FB   Dental:    (+) upper dentures and partials  Comment: Upper partial dentures  Denies loose teeth    Pulmonary: breath sounds clear to auscultation      (-) not a current smoker (ex 7.5 pk yr smoker)                          ROS comment: Sarcoidosis (stable)   Cardiovascular:  Exercise tolerance: good (>4 METS),   (+) hypertension:, hyperlipidemia      ECG reviewed  Rhythm: regular  Rate: normal           Beta Blocker:  Not on Beta Blocker      ROS comment: EKG=Normal sinus rhythm  Minimal voltage criteria for LVH, may be normal variant  otherwise normal ekg     No previous ECGs available  Confirmed by Bettye Bachelor ([id number]) on 1/13/2023 6:39:04 PM     Neuro/Psych:   (+) neuromuscular disease:,             GI/Hepatic/Renal:             Endo/Other:    (+) hypothyroidism::., .                  ROS comment: Covid 10/2021 Abdominal:   (+) obese,           Vascular: Other Findings:           Anesthesia Plan      Gabriele block     ASA 3       Induction: intravenous. BIS  MIPS: Prophylactic antiemetics administered. Anesthetic plan and risks discussed with patient. Plan discussed with CRNA.                     Carl Eagle MD   1/16/2023

## 2023-01-17 ENCOUNTER — ANESTHESIA (OUTPATIENT)
Dept: OPERATING ROOM | Age: 63
End: 2023-01-17
Payer: COMMERCIAL

## 2023-01-17 ENCOUNTER — HOSPITAL ENCOUNTER (OUTPATIENT)
Age: 63
Setting detail: OUTPATIENT SURGERY
Discharge: HOME | End: 2023-01-17
Attending: ORTHOPAEDIC SURGERY | Admitting: ORTHOPAEDIC SURGERY
Payer: COMMERCIAL

## 2023-01-17 ENCOUNTER — TELEPHONE (OUTPATIENT)
Dept: ORTHOPEDIC SURGERY | Age: 63
End: 2023-01-17

## 2023-01-17 ENCOUNTER — PREP FOR PROCEDURE (OUTPATIENT)
Dept: ORTHOPEDIC SURGERY | Age: 63
End: 2023-01-17

## 2023-01-17 VITALS
OXYGEN SATURATION: 97 % | HEIGHT: 64 IN | WEIGHT: 229 LBS | SYSTOLIC BLOOD PRESSURE: 101 MMHG | HEART RATE: 47 BPM | BODY MASS INDEX: 39.09 KG/M2 | TEMPERATURE: 98 F | DIASTOLIC BLOOD PRESSURE: 74 MMHG | RESPIRATION RATE: 16 BRPM

## 2023-01-17 DIAGNOSIS — G56.01 RIGHT CARPAL TUNNEL SYNDROME: Primary | ICD-10-CM

## 2023-01-17 PROCEDURE — 6370000000 HC RX 637 (ALT 250 FOR IP): Performed by: ANESTHESIOLOGY

## 2023-01-17 PROCEDURE — 3600000012 HC SURGERY LEVEL 2 ADDTL 15MIN: Performed by: ORTHOPAEDIC SURGERY

## 2023-01-17 PROCEDURE — 7100000010 HC PHASE II RECOVERY - FIRST 15 MIN: Performed by: ORTHOPAEDIC SURGERY

## 2023-01-17 PROCEDURE — 2580000003 HC RX 258: Performed by: ORTHOPAEDIC SURGERY

## 2023-01-17 PROCEDURE — 6360000002 HC RX W HCPCS: Performed by: ORTHOPAEDIC SURGERY

## 2023-01-17 PROCEDURE — 7100000011 HC PHASE II RECOVERY - ADDTL 15 MIN: Performed by: ORTHOPAEDIC SURGERY

## 2023-01-17 PROCEDURE — 2709999900 HC NON-CHARGEABLE SUPPLY: Performed by: ORTHOPAEDIC SURGERY

## 2023-01-17 PROCEDURE — 2580000003 HC RX 258: Performed by: ANESTHESIOLOGY

## 2023-01-17 PROCEDURE — 3700000001 HC ADD 15 MINUTES (ANESTHESIA): Performed by: ORTHOPAEDIC SURGERY

## 2023-01-17 PROCEDURE — 6360000002 HC RX W HCPCS: Performed by: NURSE ANESTHETIST, CERTIFIED REGISTERED

## 2023-01-17 PROCEDURE — 3600000002 HC SURGERY LEVEL 2 BASE: Performed by: ORTHOPAEDIC SURGERY

## 2023-01-17 PROCEDURE — 2500000003 HC RX 250 WO HCPCS: Performed by: ANESTHESIOLOGY

## 2023-01-17 PROCEDURE — 3700000000 HC ANESTHESIA ATTENDED CARE: Performed by: ORTHOPAEDIC SURGERY

## 2023-01-17 PROCEDURE — 64721 CARPAL TUNNEL SURGERY: CPT | Performed by: ORTHOPAEDIC SURGERY

## 2023-01-17 PROCEDURE — 6360000002 HC RX W HCPCS: Performed by: ANESTHESIOLOGY

## 2023-01-17 RX ORDER — MEPERIDINE HYDROCHLORIDE 25 MG/ML
12.5 INJECTION INTRAMUSCULAR; INTRAVENOUS; SUBCUTANEOUS ONCE
Status: DISCONTINUED | OUTPATIENT
Start: 2023-01-17 | End: 2023-01-17 | Stop reason: HOSPADM

## 2023-01-17 RX ORDER — ONDANSETRON 2 MG/ML
INJECTION INTRAMUSCULAR; INTRAVENOUS PRN
Status: DISCONTINUED | OUTPATIENT
Start: 2023-01-17 | End: 2023-01-17 | Stop reason: SDUPTHER

## 2023-01-17 RX ORDER — SODIUM CHLORIDE 0.9 % (FLUSH) 0.9 %
5-40 SYRINGE (ML) INJECTION EVERY 12 HOURS SCHEDULED
Status: DISCONTINUED | OUTPATIENT
Start: 2023-01-17 | End: 2023-01-17 | Stop reason: HOSPADM

## 2023-01-17 RX ORDER — PROPOFOL 10 MG/ML
INJECTION, EMULSION INTRAVENOUS PRN
Status: DISCONTINUED | OUTPATIENT
Start: 2023-01-17 | End: 2023-01-17 | Stop reason: SDUPTHER

## 2023-01-17 RX ORDER — PROCHLORPERAZINE EDISYLATE 5 MG/ML
5 INJECTION INTRAMUSCULAR; INTRAVENOUS
Status: DISCONTINUED | OUTPATIENT
Start: 2023-01-17 | End: 2023-01-17 | Stop reason: HOSPADM

## 2023-01-17 RX ORDER — FENTANYL CITRATE 0.05 MG/ML
50 INJECTION, SOLUTION INTRAMUSCULAR; INTRAVENOUS EVERY 5 MIN PRN
Status: DISCONTINUED | OUTPATIENT
Start: 2023-01-17 | End: 2023-01-17 | Stop reason: HOSPADM

## 2023-01-17 RX ORDER — SODIUM CHLORIDE 9 MG/ML
INJECTION, SOLUTION INTRAVENOUS PRN
Status: DISCONTINUED | OUTPATIENT
Start: 2023-01-17 | End: 2023-01-17 | Stop reason: HOSPADM

## 2023-01-17 RX ORDER — LIDOCAINE HYDROCHLORIDE 5 MG/ML
INJECTION, SOLUTION INFILTRATION; INTRAVENOUS
Status: COMPLETED | OUTPATIENT
Start: 2023-01-17 | End: 2023-01-17

## 2023-01-17 RX ORDER — MIDAZOLAM HYDROCHLORIDE 1 MG/ML
INJECTION INTRAMUSCULAR; INTRAVENOUS PRN
Status: DISCONTINUED | OUTPATIENT
Start: 2023-01-17 | End: 2023-01-17 | Stop reason: SDUPTHER

## 2023-01-17 RX ORDER — SODIUM CHLORIDE, SODIUM LACTATE, POTASSIUM CHLORIDE, CALCIUM CHLORIDE 600; 310; 30; 20 MG/100ML; MG/100ML; MG/100ML; MG/100ML
INJECTION, SOLUTION INTRAVENOUS CONTINUOUS
Status: DISCONTINUED | OUTPATIENT
Start: 2023-01-17 | End: 2023-01-17 | Stop reason: HOSPADM

## 2023-01-17 RX ORDER — DIPHENHYDRAMINE HYDROCHLORIDE 50 MG/ML
12.5 INJECTION INTRAMUSCULAR; INTRAVENOUS
Status: DISCONTINUED | OUTPATIENT
Start: 2023-01-17 | End: 2023-01-17 | Stop reason: HOSPADM

## 2023-01-17 RX ORDER — PROMETHAZINE HYDROCHLORIDE 25 MG/ML
25 INJECTION, SOLUTION INTRAMUSCULAR; INTRAVENOUS ONCE
Status: COMPLETED | OUTPATIENT
Start: 2023-01-17 | End: 2023-01-17

## 2023-01-17 RX ORDER — SODIUM CHLORIDE 0.9 % (FLUSH) 0.9 %
5-40 SYRINGE (ML) INJECTION PRN
Status: DISCONTINUED | OUTPATIENT
Start: 2023-01-17 | End: 2023-01-17 | Stop reason: HOSPADM

## 2023-01-17 RX ORDER — HYDROCODONE BITARTRATE AND ACETAMINOPHEN 5; 325 MG/1; MG/1
1 TABLET ORAL EVERY 6 HOURS PRN
Qty: 20 TABLET | Refills: 0 | Status: SHIPPED | OUTPATIENT
Start: 2023-01-17 | End: 2023-01-22

## 2023-01-17 RX ORDER — MORPHINE SULFATE 2 MG/ML
1 INJECTION, SOLUTION INTRAMUSCULAR; INTRAVENOUS EVERY 5 MIN PRN
Status: DISCONTINUED | OUTPATIENT
Start: 2023-01-17 | End: 2023-01-17 | Stop reason: HOSPADM

## 2023-01-17 RX ORDER — HYDROCODONE BITARTRATE AND ACETAMINOPHEN 5; 325 MG/1; MG/1
1 TABLET ORAL EVERY 6 HOURS PRN
Status: DISCONTINUED | OUTPATIENT
Start: 2023-01-17 | End: 2023-01-17 | Stop reason: HOSPADM

## 2023-01-17 RX ORDER — FENTANYL CITRATE 50 UG/ML
INJECTION, SOLUTION INTRAMUSCULAR; INTRAVENOUS PRN
Status: DISCONTINUED | OUTPATIENT
Start: 2023-01-17 | End: 2023-01-17 | Stop reason: SDUPTHER

## 2023-01-17 RX ADMIN — HYDROCODONE BITARTRATE AND ACETAMINOPHEN 1 TABLET: 5; 325 TABLET ORAL at 07:53

## 2023-01-17 RX ADMIN — FENTANYL CITRATE 50 MCG: 50 INJECTION INTRAMUSCULAR; INTRAVENOUS at 07:04

## 2023-01-17 RX ADMIN — PROPOFOL 75 MG: 10 INJECTION, EMULSION INTRAVENOUS at 07:08

## 2023-01-17 RX ADMIN — ONDANSETRON 4 MG: 2 INJECTION INTRAMUSCULAR; INTRAVENOUS at 07:08

## 2023-01-17 RX ADMIN — PROPOFOL 50 MCG/KG/MIN: 10 INJECTION, EMULSION INTRAVENOUS at 07:13

## 2023-01-17 RX ADMIN — LIDOCAINE HYDROCHLORIDE 50 ML: 5 INJECTION, SOLUTION INFILTRATION at 07:04

## 2023-01-17 RX ADMIN — CEFAZOLIN 2000 MG: 2 INJECTION, POWDER, FOR SOLUTION INTRAMUSCULAR; INTRAVENOUS at 06:58

## 2023-01-17 RX ADMIN — FENTANYL CITRATE 50 MCG: 50 INJECTION INTRAMUSCULAR; INTRAVENOUS at 07:08

## 2023-01-17 RX ADMIN — SODIUM CHLORIDE, POTASSIUM CHLORIDE, SODIUM LACTATE AND CALCIUM CHLORIDE: 600; 310; 30; 20 INJECTION, SOLUTION INTRAVENOUS at 06:12

## 2023-01-17 RX ADMIN — PROMETHAZINE HYDROCHLORIDE 25 MG: 25 INJECTION INTRAMUSCULAR; INTRAVENOUS at 08:12

## 2023-01-17 RX ADMIN — MIDAZOLAM 2 MG: 1 INJECTION INTRAMUSCULAR; INTRAVENOUS at 07:03

## 2023-01-17 ASSESSMENT — PAIN DESCRIPTION - DESCRIPTORS
DESCRIPTORS: ACHING

## 2023-01-17 ASSESSMENT — PAIN DESCRIPTION - ORIENTATION
ORIENTATION: RIGHT

## 2023-01-17 ASSESSMENT — PAIN DESCRIPTION - PAIN TYPE
TYPE: SURGICAL PAIN

## 2023-01-17 ASSESSMENT — PAIN SCALES - GENERAL
PAINLEVEL_OUTOF10: 9
PAINLEVEL_OUTOF10: 9
PAINLEVEL_OUTOF10: 8
PAINLEVEL_OUTOF10: 8

## 2023-01-17 ASSESSMENT — PAIN - FUNCTIONAL ASSESSMENT: PAIN_FUNCTIONAL_ASSESSMENT: 0-10

## 2023-01-17 ASSESSMENT — PAIN DESCRIPTION - LOCATION
LOCATION: WRIST
LOCATION: HAND

## 2023-01-17 NOTE — ANESTHESIA POSTPROCEDURE EVALUATION
Department of Anesthesiology  Postprocedure Note    Patient: Noemy Palomino  MRN: [id number]  YOB: 1960  Date of evaluation: 1/17/2023      Procedure Summary     Date: 01/17/23 Room / Location: [address] [place] [address] 01 / [address] [facility], [place], operated by [facility]    Anesthesia Start: 0701 Anesthesia Stop: 4937    Procedure: RIGHT WRIST CARPAL TUNNEL RELEASE- 1/17/23 (Right) Diagnosis:       Carpal tunnel syndrome on right      (Carpal tunnel syndrome on right [G56.01])    Surgeons: Cornel Davidson DO Responsible Provider: Corby Crystal MD    Anesthesia Type: MAC ASA Status: 3          Anesthesia Type: MAC    Dominga Phase I: Dominga Score: 10    Dominga Phase II: Dominga Score: 10      Anesthesia Post Evaluation    Patient location during evaluation: PACU  Patient participation: complete - patient participated  Level of consciousness: awake  Airway patency: patent  Nausea & Vomiting: nausea  Complications: no  Cardiovascular status: hemodynamically stable  Respiratory status: room air and spontaneous ventilation  Hydration status: stable  Comments: Phenergan in PACU.  Advised to ask for Scop patch next anesthetic

## 2023-01-17 NOTE — OP NOTE
Operative Note      Patient: Regina Lambert  YOB: 1960  MRN: [id number]    Date of Procedure: 1/17/2023    Pre-Op Diagnosis: Carpal tunnel syndrome on right [G56.01]    Post-Op Diagnosis: Same       Procedure(s):  RIGHT WRIST CARPAL TUNNEL RELEASE- 1/17/23    Surgeon(s): Mihaela Herbert DO    Assistant:   * No surgical staff found *    Anesthesia: Purvis Block    Estimated Blood Loss (mL): less than 140     Complications: None    Specimens:   * No specimens in log *    Implants:  * No implants in log *      Drains: * No LDAs found *    Findings: as above    Detailed Description of Procedure:   below    SURGEON: [facility] D.O.   ASSISTANT: as above  PREOPERATIVE DIAGNOSIS: Right wrist carpal tunnel syndrome. POSTOPERATIVE DIAGNOSIS: Right wrist carpal tunnel syndrome. PROCEDURE: Release transverse carpal ligament, Right wrist.   ANESTHESIA: bb  ESTIMATED BLOOD LOSS: minimal in degree  COMPLICATIONS: None. Brief Hospital Course: The  patients well  known to Andre Crowe DO's practice with persistent complaints of right wrist/hand pain and numbness. Wrsit and hand pain has failed to be relieved by non-operative conservative measures, and has began affecting daily activities of living. After examination of the patient, review of the EMG, radiologic studies, and appropriate pre-operative risk assessment, Andre Crowe DO recommended right carpal tunnel release,  which the patient was agreeable towards. OPERATIVE PROCEDURE: The patient was brought to the operating suite and was   given anesthesia. The right arm was identified with a   preoperative time-out, the arm was prepped and draped in sterile fashion, I  outlined incision along the volar side of the wrist just ulnar to the thenar   wrist crease. I made an approximately 2 to 4-cm incision over this area through the skin   and subcutaneous tissue. I dissected that down to the level of the palmar   fascia.  It was identified and I released it sharply. I Identified the   transverse carpal ligament and incised this with a 15-blade. Using tenotomy   scissors, I finished the release of the ligament proximally and distally to its full extent. I thoroughly irrigated the wound upon closure. I closed the incision with 4-0 Prolene in a horizontal mattress type fashion. Sterile dressing was placed on the wound. The patient recovered to the   recovery room without difficulty.       Electronically signed by Melissa Valles DO on 1/17/2023 at 6:34 AM

## 2023-01-17 NOTE — DISCHARGE INSTRUCTIONS
[id number] [facility]  [phone]  Ailyn Sky D.O.          Keep dressing clean and dry for 7 days. Change your operative dressing on post-op day #7. Use bandaids over incision site. Keep hand elevated as much as possible over the next 24-48 hours. Use ice to operative site about 20 min out of every hour. This helps reduce pain and swelling. You may shower with soap and water on post-op day #1 but keep your incision/dressing clean and dry. After 7 days when dressing is removed, may shower and let water run over incision site. Do not scrub over area. Non-weight bearing activities with affected hand until post-op appointment. No lifting over 5 pounds. May begin moving fingers immediately. No heavy lifting. No pushing or pulling. Take pain medications as prescribed. If you anticipate the need for a refill on your medication and the weekend is approaching, please call the office by noon on Friday. No refills will be called in over the weekend. DO NOT take Tylenol products while taking prescribed pain medicine. Resume regular diet and medications (unless otherwise directed by your doctor). You should have a responsible adult with you for 24 hours. If you have any questions or concerns, please call the office. If any problems occur or if you have any further questions, please call your doctor as soon as possible. If you find that you cannot reach your doctor but feel that your condition needs a doctors attention go to the emergency room. Nausea and Vomiting After Surgery: Care Instructions  Your Care Instructions     After you've had surgery, you may feel sick to your stomach (nauseated) or you may vomit. Sometimes anesthesia can make you feel sick. It's a common side effect and often doesn't last long. Pain also can make you feel sick or vomit.  After the anesthesia wears off, you may feel pain from the incision (cut). That pain could then upset your stomach. Taking pain medicine can also make you feel sick to your stomach. Whatever the cause, you may get medicine that can help. There are also some things you can do at home to prevent nausea and feel better. The doctor has checked you carefully, but problems can develop later. If you notice any problems or new symptoms, get medical treatment right away. Follow-up care is a key part of your treatment and safety. Be sure to make and go to all appointments, and call your doctor if you are having problems. It's also a good idea to know your test results and keep a list of the medicines you take. How can you care for yourself at home? Be safe with medicines. Read and follow all instructions on the label. If the doctor gave you a prescription medicine for pain, take it as prescribed. If you are not taking a prescription pain medicine, ask your doctor if you can take an over-the-counter medicine. Take your pain medicine as soon as you have pain. It works better if you take it before the pain gets bad. Call your doctor if you have any problems with your medicine. Rest in bed until you feel better. To prevent dehydration, drink plenty of fluids. Choose water and other clear liquids until you feel better. If you have kidney, heart, or liver disease and have to limit fluids, talk with your doctor before you increase the amount of fluids you drink. When you are able to eat, try clear soups, mild foods, and liquids until all symptoms are gone for 12 to 48 hours. Other good choices include dry toast, crackers, cooked cereal, and gelatin dessert, such as Jell-O. Do not smoke. Smoking and being around smoke can make nausea worse. If you need help quitting, talk to your doctor about stop-smoking programs and medicines. These can increase your chances of quitting for good. When should you call for help? Call 911  anytime you think you may need emergency care.  For example, call if:    You passed out (lost consciousness). Call your doctor now or seek immediate medical care if:    You have new or worse nausea or vomiting. You are too sick to your stomach to drink any fluids. You cannot keep down fluids. You have symptoms of dehydration, such as:  Dry eyes and a dry mouth. Passing only a little urine. Feeling thirstier than usual.     Your pain medicine is not helping. You are dizzy or lightheaded, or you feel like you may faint. Watch closely for changes in your health, and be sure to contact your doctor if:    You do not get better as expected. Current as of: March 9, 2022               Content Version: 13.5  © 2006-2022 [employer]. Care instructions adapted under license by [facility] [address] If you have questions about a medical condition or this instruction, always ask your healthcare professional. [address] any warranty or liability for your use of this information. Infection After Surgery: Care Instructions  Overview  After surgery, an infection is always possible. It doesn't mean that the surgery didn't go well. Because an infection can be serious, your doctor has taken steps to manage it. Your doctor checked the infection and cleaned it if necessary. Your doctor may have made an opening in the area so that the pus can drain out. You may have gauze in the cut so that the area will stay open and keep draining. You may need antibiotics. You will need to follow up with your doctor to make sure the infection has gone away. Follow-up care is a key part of your treatment and safety. Be sure to make and go to all appointments, and call your doctor if you are having problems. It's also a good idea to know your test results and keep a list of the medicines you take. How can you care for yourself at home?   Make sure your surgeon knows about the infection, especially if you saw another doctor about your symptoms. If your doctor prescribed antibiotics, take them as directed. Do not stop taking them just because you feel better. You need to take the full course of antibiotics. Ask your doctor if you can take an over-the-counter pain medicine, such as acetaminophen (Tylenol), ibuprofen (Advil, Motrin), or naproxen (Aleve). Be safe with medicines. Read and follow all instructions on the label. Do not take two or more pain medicines at the same time unless the doctor told you to. Many pain medicines have acetaminophen, which is Tylenol. Too much acetaminophen (Tylenol) can be harmful. Prop up the area on a pillow anytime you sit or lie down during the next 3 days. Try to keep it above the level of your heart. This will help reduce swelling. Keep the skin clean and dry. You may have a bandage over the cut (incision). A bandage helps the incision heal and protects it. Your doctor will tell you how to take care of this. Keep it clean and dry. You may have drainage from the wound. If your doctor told you how to care for your incision, follow your doctor's instructions. If you did not get instructions, follow this general advice:  Wash around the incision with clean water 2 times a day. Don't use hydrogen peroxide or alcohol, which can slow healing. When should you call for help? Call your doctor now or seek immediate medical care if:    You have signs that your infection is getting worse, such as: Increased pain, swelling, warmth, or redness in the area. Red streaks leading from the area. Pus draining from the wound. A new or higher fever. Watch closely for changes in your health, and be sure to contact your doctor if you have any problems. Where can you learn more? Go to http://www.woods.com/ and enter C340 to learn more about \"Infection After Surgery: Care Instructions. \"  Current as of: January 20, 2022               Content Version: 13.5  © 0367-0127 [employer], [facility].    Care instructions adapted under license by [facility] ([facility]). If you have questions about a medical condition or this instruction, always ask your healthcare professional. [address] any warranty or liability for your use of this information.

## 2023-01-17 NOTE — H&P
Updated H&P    Chief Complaint   Patient presents with    Carpal Tunnel       Bilateral Hand F/U, R>L         Hemal Pacheco is a 57y.o. year old   @female@ who presents today for a carpal tunnel follow up. R>L     Past Medical History        Past Medical History:   Diagnosis Date    H/O cardiovascular stress test 01/11/2017     Lexiscan    HTN (hypertension)      Hypothyroid      Organic insomnia      Persistent disorder of initiating or maintaining sleep      Sarcoidosis           Past Surgical History   No past surgical history on file. Current Medication      Current Outpatient Medications:     potassium chloride (K-TAB) 10 MEQ extended release tablet, Take 1 tablet by mouth daily, Disp: 30 tablet, Rfl: 0    amLODIPine (NORVASC) 5 MG tablet, TAKE 1 TABLET BY MOUTH ONE TIME A DAY, Disp: 90 tablet, Rfl: 1    ergocalciferol (ERGOCALCIFEROL) 1.25 MG (64913 UT) capsule, weekly, Disp: 12 capsule, Rfl: 1    levothyroxine (SYNTHROID) 50 MCG tablet, Take 1 tablet by mouth daily, Disp: 90 tablet, Rfl: 1    olmesartan-hydroCHLOROthiazide (BENICAR HCT) 40-25 MG per tablet, Take 1 tablet by mouth daily, Disp: 90 tablet, Rfl: 1    zolpidem (AMBIEN) 10 MG tablet, Take 1 tablet by mouth nightly as needed for Sleep for up to 90 days. , Disp: 90 tablet, Rfl: 0    albuterol sulfate HFA (PROVENTIL HFA) 108 (90 Base) MCG/ACT inhaler, Inhale 2 puffs into the lungs 4 times daily as needed for Wheezing, Disp: 1 each, Rfl: 2          Allergies   Allergen Reactions    Doxycycline Hives      Social History               Socioeconomic History    Marital status:        Spouse name: Not on file    Number of children: Not on file    Years of education: Not on file    Highest education level: Not on file   Occupational History    Not on file   Tobacco Use    Smoking status: Former       Packs/day: 0.25       Years: 15.00       Pack years: 3.75       Types: Cigarettes       Quit date: 9/22/2000       Years since quittin.2    Smokeless tobacco: Never   Vaping Use    Vaping Use: Never used   Substance and Sexual Activity    Alcohol use: Yes       Comment: socially.  No coffee for a week     Drug use: No    Sexual activity: Yes       Partners: Male   Other Topics Concern    Not on file   Social History Narrative    Not on file      Social Determinants of Health          Financial Resource Strain: Low Risk     Difficulty of Paying Living Expenses: Not hard at all   Food Insecurity: No Food Insecurity    Worried About Running Out of Food in the Last Year: Never true    Ran Out of Food in the Last Year: Never true   Transportation Needs: Not on file   Physical Activity: Not on file   Stress: Not on file   Social Connections: Not on file   Intimate Partner Violence: Not on file   Housing Stability: Not on file         Family History         Family History   Problem Relation Age of Onset    Kidney Disease Mother      Breast Cancer Mother 50    Cancer Father              REVIEW OF SYSTEMS:      General/Constitution:  (-)weight loss, (-)fever, (-)chills, (-)weakness.   Skin: (-) rash,(-) psoriasis,(-) eczema, (-)skin cancer.   Musculoskeletal: (-) fractures,  (-) dislocations,(-) collagen vascular disease, (-) fibromyalgia, (-) multiple sclerosis, (-) muscular dystrophy, (-) RSD,(-) joint pain (-)swelling, (-) joint pain,swelling.  Neurologic: (-) epilepsy, (-)seizures,(-) brain tumor,(-) TIA, (-)stroke, (-)headaches, (-)Parkinson disease,(-) memory loss, (-) LOC.  Cardiovascular: (-) Chest pain, (-) swelling in legs/feet, (-) SOB, (-) cramping in legs/feet with walking.  Respiratory: (-) SOB, (-) Coughing, (-) night sweats.  GI: (-) nausea, (-) vomiting, (-) diarrhea, (-) blood in stool, (-) gastric ulcer.  Psychiatric: (-) Depression, (-) Anxiety, (-) bipolar disease, (-) Alzheimer's Disease  Allergic/Immunologic: (-) allergies latex, (-) allergies metal, (-) skin sensitivity.  Hematlogic: (-) anemia, (-) blood transfusion,  (-) DVT/PE, (-) Clotting disorders        SUBJECTIVE:        Vital signs are stable. In general, patient is awake, alert and oriented X3, in no apparent distress. Examination of HENT reveals normocephalic, atraumatic. PERRLA/EOMI sclera are white. Conjunctivae are clear. TM's are intact. Pharynx is pink and moist.  Uvula and tongue are midline. Heart: Positive S1 and positive S2 with regular rate and rhythm. Lungs: Clear to auscultation bilaterally without rales, rhonchi or wheezes. Abdomen: soft, nontender. Positive bowel sounds. No organomegaly. No guarding or rigidity. Constitution:     /86   Pulse 78   Temp 98 °F (36.7 °C) (Skin)   Resp 16   Ht 5' 4\" (1.626 m)   Wt 229 lb (103.9 kg)   SpO2 93%   BMI 39.31 kg/m²        Psycihatric:     The patient is alert and oriented x 3, appears to be stated age and in no distress. Respiratory:     Respiratory effort is not labored. Patient is not gasping. Palpation of the chest reveals no tactile fremitus. Skin:     Upon inspection: the skin appears warm, dry and intact. There is not a previous scar over the affected area. There is not any cellulitis, lymphedema or cutaneous lesions noted in the lower extremities. Upon palpation there is no induration noted. Neurologic:     Gait: normal;  Motor exam of the upper extremities show: The reflexes in biceps/triceps/brachioradialis are equal and symmetric. Sensory exam C5-T1 are normal bilaterally. Cardiovascular: The vascular exam is normal and is well perfused to distal extremities. There are 2+ radial pulses bilaterally, and motor and sensation is intact to median, ulnar, and radial, musclocutaneus, and axillary nerve distribution and grossly symmetric bilaterally. There is cap refill noted less than two seconds in all digits. There is not edema of the bilateral upper extremities. There is not varicosities noted in the distal extremities.        Lymph: Upon palpation,  there is no lymphadenopathy noted in bilateral upper extremities. Musculoskeletal:     Cervical Exam:     On physical exam, Radhames Cassidy is well-developed, well-nourished, oriented to person, place and time. her gait is normal.  On evaluation of her cervical spine, she has full range of motion of the cervical spine without pain. There is no cervical tenderness to palpation. Shoulder Exam:     On evaluation of her bilaterally upper extremities, her bilateral shoulder has no deformity. There is not evidence of scapular dyskinesis. There is not muscle atrophy in shoulder girdle. The range of motion for the Right Shoulder is 150/45/t10 and for the Left shoulder is 150/45/t10. Right shoulder Motor strength is 5/5 in the supraspinatus, 5/5 internal rotation and 5/5 in external rotation, and Left shoulder motor strength 5/5 in supraspinatus, 5/5 in internal rotation, 5/5 in external rotation. Elbow exam:     Evaluation of the elbow, reveals no signs of swelling or deformity. ROM is 0-140. There is not instability with varus/valgus stresses. Motor strength is 5/5 with flexion/extension. Wrist exam:       Inspection of the bilateral upper extremities, there is no evidence of deformity of the wrist.  ROM Wrist ROM R wrist DF 90, VF 90, L wrist DF 90, VF 90, R pronation 90/ supination 90, L pronation 90/supination 90. Motor strength is 5/5 with Dorsiflexion/Volarflexion/Supination/Pronation.  strength 4/5. Thenar eminence appears bilaterally symmetrical.   Motor and sensation is intact and symmetric throughout the bilateral upper extremities in the  ulnar and radial , musclcutaneous, and axillary nerve distributions. There is paresthesia in the median nerve distribution of the right hand. Hand exam:     The skin overlying the hand is  intact. There is not evidence of scar, lesion, laceration, or abrasion.   The motion in the small joints of the hand are intact with no stiffness or deformity. The ROM in the MCP flexion 90/ extension 0 , PIP flexion 90/ extension 0, DIP flexion 70/ extension 0. There is not rotational deformity. There is no masses or adenopathy in bilateral upper extremities. Radial pulses are 2+ and symmetric bilaterally. Capillary refill is intact and < 2 seconds. Motor strength is 5/5 with flexion and extension of the small finger joints. Right:  Phallens sign(+), Tinnells sign (+), Median nerve compression test (+),  Finklesteins (-), [employer] Grind test (-), [employer](-). Left:  Phallens sign(+), Tinnells sign (+), Median nerve compression test (+),  Finklesteins (-), [employer] Grind test (-), [employer](-). X-rays:  n/a     EMG:  This study was abnormal.       Electrodiagnosis: There is electrodiagnostic evidence of a median mononeuropathy. \" Location: bilateral at the wrist.   \" Nature: [  ] Axonal   [ X ] Demyelinating  [  ] Mixed axonal and demyelinating                      [  ] Sensory [  ] Motor               [ X ] Mixed sensorimotor                      [  ] with active denervation       Pich.Banister ] without active denervation   \" Duration: Acute   \" Severity: moderate   \" Prognosis: Good. The prognosis for recovery of demyelinating lesions is good if the cause is alleviated. Previous Study: no         Follow up EMG is recommended if no surgical intervention and symptoms persist in one year        Impression:        Encounter Diagnoses   Name Primary? Right carpal tunnel syndrome Yes    Left carpal tunnel syndrome              Plan: Natural history and expected course discussed. Questions answered. Educational materials distributed. Rest, ice, compression, and elevation (RICE) therapy. Reduction in offending activity discussed. Volar wrist splint to be worn at night. I had a lengthy discussion with the patient regarding their diagnosis. I explained treatment options including surgical vs non surgical treatment.  I reviewed in detail the risks and benefits and outlined the procedure in detail with expected outcomes and possible complications. I also discussed non surgical treatment such as injections (CSI and visco supplementation), physical therapy, topical creams and NSAID's. They have elected for surgical management at this time. We will perform a Right carpal tunnel release 1/17/23 and left carpal tunnel release on 1/31/22. The risks and benefits were reviewed with the patient such as:  DVT, infection,  injuries to blood vessels and nerves, non relief of symptoms, continued pain, worsening of symptoms. The patient was counseled at length about the risks of kallie Covid-19 during their perioperative period and any recovery window from their procedure. The patient was made aware that kallie Covid-19  may worsen their prognosis for recovering from their procedure  and lend to a higher morbidity and/or mortality risk. All material risks, benefits, and reasonable alternatives including postponing the procedure were discussed. The patient does wish to proceed with the procedure at this time.

## 2023-01-17 NOTE — TELEPHONE ENCOUNTER
Prior Authorization Form:      DEMOGRAPHICS:                     Patient Name:  Everett Robledo  Patient :  1960            Insurance:  Payor: Evelyne Shaver 150 / Plan: HARESH B[employer]IR[employer] [address] / Product Type: *No Product type* /   Insurance ID Number:    Payer/Plan Subscr  Sex Relation Sub. Ins. ID Effective Group Num   1. [facility] 1960 Female Self [id number] 22 [id number]                                   [address]   2.  Allan Yousif 1961 Male Spouse [id number] 18 [id number]                                   [address]         DIAGNOSIS & PROCEDURE:                       Procedure/Operation: LEFT WRIST CARPAL TUNNEL RELEASE           CPT Code: 44221    Diagnosis:  LEFT WRIST CARPAL TUNNEL    ICD10 Code: G56.02    Location:  [facility]    Surgeon:  SENDY LEE    SCHEDULING INFORMATION:                          Date: 23    Time: TO FOLLOW              Anesthesia:  Gabriele Block                                                       Status:  Outpatient        Special Comments:  NONE       Electronically signed by Sherren Lagos, ATC on 2023 at 7:45 AM

## 2023-01-17 NOTE — PROGRESS NOTES
Pt verbalized readiness to be discharged. Pt still slightly nauseased but requesting to be discharged.

## 2023-01-17 NOTE — ANESTHESIA PROCEDURE NOTES
Peripheral Block    Patient location during procedure: OR  Reason for block: primary anesthetic  Start time: 1/17/2023 7:04 AM  End time: 1/17/2023 7:07 AM  Staffing  Performed: anesthesiologist and resident/CRNA   Anesthesiologist: Reddy Red MD  Resident/CRNA: FERNANDO Mcfadden - CRNA  Preanesthetic Checklist  Completed: patient identified, IV checked, site marked, risks and benefits discussed, surgical/procedural consents, equipment checked, pre-op evaluation, timeout performed, anesthesia consent given, oxygen available, monitors applied/VS acknowledged, fire risk safety assessment completed and verbalized and blood product R/B/A discussed and consented  Peripheral Block   Patient position: supine  Prep: alcohol swabs  Provider prep: mask  Patient monitoring: continuous pulse ox, continuous capnometry, cardiac monitor, frequent blood pressure checks, IV access, oxygen and responsive to questions  Block type: [place] block  Laterality: right  Injection technique: single-shot  Guidance: other      Medications Administered  lidocaine PF 0.5 % - IntraVENous, Hand Right   50 mL - 1/17/2023 7:04:00 AM

## 2023-01-23 RX ORDER — HYDROCODONE BITARTRATE AND ACETAMINOPHEN 5; 325 MG/1; MG/1
1 TABLET ORAL EVERY 6 HOURS PRN
Status: ON HOLD | COMMUNITY
End: 2023-01-31 | Stop reason: HOSPADM

## 2023-01-30 ENCOUNTER — ANESTHESIA EVENT (OUTPATIENT)
Dept: OPERATING ROOM | Age: 63
End: 2023-01-30
Payer: COMMERCIAL

## 2023-01-30 ENCOUNTER — OFFICE VISIT (OUTPATIENT)
Dept: ORTHOPEDIC SURGERY | Age: 63
End: 2023-01-30

## 2023-01-30 VITALS — TEMPERATURE: 98 F | WEIGHT: 229 LBS | BODY MASS INDEX: 39.09 KG/M2 | HEIGHT: 64 IN

## 2023-01-30 DIAGNOSIS — G56.01 RIGHT CARPAL TUNNEL SYNDROME: Primary | ICD-10-CM

## 2023-01-30 PROCEDURE — 99024 POSTOP FOLLOW-UP VISIT: CPT | Performed by: ORTHOPAEDIC SURGERY

## 2023-01-30 ASSESSMENT — LIFESTYLE VARIABLES: SMOKING_STATUS: 0

## 2023-01-30 NOTE — ANESTHESIA PRE PROCEDURE
Department of Anesthesiology  Preprocedure Note       Name:  Bethanne Siemens   Age:  57 y.o.  :  1960                                          MRN:  [id number]         Date:  2023      Surgeon: Terrence Hu): Js Reyes DO    Procedure: Procedure(s):  LEFT CARPAL TUNNEL RELEASE- 23    Medications prior to admission:   Prior to Admission medications    Medication Sig Start Date End Date Taking? Authorizing Provider   HYDROcodone-acetaminophen (NORCO) 5-325 MG per tablet Take 1 tablet by mouth every 6 hours as needed for Pain. Historical Provider, MD   potassium chloride (K-TAB) 10 MEQ extended release tablet Take 1 tablet by mouth daily 22   Taylor Gay, DO   amLODIPine (NORVASC) 5 MG tablet TAKE 1 TABLET BY MOUTH ONE TIME A DAY  Patient taking differently: Take 5 mg by mouth daily AM 22   Taylor Gay, DO   ergocalciferol (ERGOCALCIFEROL) 1.25 MG (32887 UT) capsule weekly 22   Taylor Gay, DO   levothyroxine (SYNTHROID) 50 MCG tablet Take 1 tablet by mouth daily 22   Taylor Gay, DO   olmesartan-hydroCHLOROthiazide (BENICAR HCT) 40-25 MG per tablet Take 1 tablet by mouth daily 22   Taylor Gay, DO   zolpidem (AMBIEN) 10 MG tablet Take 1 tablet by mouth nightly as needed for Sleep for up to 90 days. 22  Taylor Gay,    albuterol sulfate HFA (PROVENTIL HFA) 108 (90 Base) MCG/ACT inhaler Inhale 2 puffs into the lungs 4 times daily as needed for Wheezing 3/18/22   Taylor Gay, DO       Current medications:    Current Outpatient Medications   Medication Sig Dispense Refill    HYDROcodone-acetaminophen (NORCO) 5-325 MG per tablet Take 1 tablet by mouth every 6 hours as needed for Pain.       potassium chloride (K-TAB) 10 MEQ extended release tablet Take 1 tablet by mouth daily 30 tablet 0    amLODIPine (NORVASC) 5 MG tablet TAKE 1 TABLET BY MOUTH ONE TIME A DAY (Patient taking differently: Take 5 mg by mouth daily AM) 90 tablet 1    ergocalciferol (ERGOCALCIFEROL) 1.25 MG (29260 UT) capsule weekly 12 capsule 1    levothyroxine (SYNTHROID) 50 MCG tablet Take 1 tablet by mouth daily 90 tablet 1    olmesartan-hydroCHLOROthiazide (BENICAR HCT) 40-25 MG per tablet Take 1 tablet by mouth daily 90 tablet 1    zolpidem (AMBIEN) 10 MG tablet Take 1 tablet by mouth nightly as needed for Sleep for up to 90 days. 90 tablet 0    albuterol sulfate HFA (PROVENTIL HFA) 108 (90 Base) MCG/ACT inhaler Inhale 2 puffs into the lungs 4 times daily as needed for Wheezing 1 each 2     No current facility-administered medications for this visit. Allergies:     Allergies   Allergen Reactions    Doxycycline Hives       Problem List:    Patient Active Problem List   Diagnosis Code    Primary insomnia F51.01    Vitamin D deficiency E55.9    Hypothyroidism E03.9    Essential hypertension I10    Acute pain of left knee M25.562    Right carpal tunnel syndrome G56.01    Left carpal tunnel syndrome G56.02       Past Medical History:        Diagnosis Date    COVID-19 10/2021    muscle aches coughing HA    H/O cardiovascular stress test 2017    Lexiscan    neg per pt    HTN (hypertension)     Hyperlipidemia     Hypothyroid     Organic insomnia     PONV (postoperative nausea and vomiting)     Sarcoidosis     bilat lungs  (remission x 15 yrs)       Past Surgical History:        Procedure Laterality Date    BRONCHOSCOPY      done for DX of sarcoidosis    CARPAL TUNNEL RELEASE Right 2023    RIGHT WRIST CARPAL TUNNEL RELEASE- 23 performed by Js Reyes DO at [address] [place] [address] (CERVIX STATUS UNKNOWN)         Social History:    Social History     Tobacco Use    Smoking status: Former     Packs/day: 0.50     Years: 15.00     Pack years: 7.50     Types: Cigarettes     Quit date: 2000     Years since quittin.3    Smokeless tobacco: Never   Substance Use Topics    Alcohol use: Yes     Comment: occas wine                                Counseling given: Not Answered      Vital Signs (Current): There were no vitals filed for this visit. BP Readings from Last 3 Encounters:   01/17/23 101/74   01/11/23 136/82   11/18/22 130/88       NPO Status:  >8.H                                                                               BMI:   Wt Readings from Last 3 Encounters:   01/17/23 229 lb (103.9 kg)   01/11/23 228 lb 11.2 oz (103.7 kg)   12/27/22 230 lb (104.3 kg)     There is no height or weight on file to calculate BMI.    CBC:   Lab Results   Component Value Date/Time    WBC 5.0 02/19/2021 01:08 PM    RBC 4.50 02/19/2021 01:08 PM    HGB 13.0 02/19/2021 01:08 PM    HCT 39.5 02/19/2021 01:08 PM    MCV 87.8 02/19/2021 01:08 PM    RDW 14.9 02/19/2021 01:08 PM     02/19/2021 01:08 PM       CMP:   Lab Results   Component Value Date/Time     01/11/2023 02:59 PM    K 4.1 01/11/2023 02:59 PM     01/11/2023 02:59 PM    CO2 27 01/11/2023 02:59 PM    BUN 16 01/11/2023 02:59 PM    CREATININE 1.1 01/11/2023 02:59 PM    GFRAA 50 02/19/2021 01:08 PM    LABGLOM 56 01/11/2023 02:59 PM    GLUCOSE 97 01/11/2023 02:59 PM    GLUCOSE 100 05/11/2011 12:28 PM    PROT 7.8 12/20/2022 11:56 AM    CALCIUM 10.1 01/11/2023 02:59 PM    BILITOT 0.7 12/20/2022 11:56 AM    ALKPHOS 71 12/20/2022 11:56 AM    AST 29 12/20/2022 11:56 AM    ALT 36 12/20/2022 11:56 AM       POC Tests: No results for input(s): POCGLU, POCNA, POCK, POCCL, POCBUN, POCHEMO, POCHCT in the last 72 hours.     Coags: No results found for: PROTIME, INR, APTT    HCG (If Applicable): No results found for: PREGTESTUR, PREGSERUM, HCG, HCGQUANT     ABGs: No results found for: PHART, PO2ART, LCR0TCD, QXD5FWY, BEART, [id number]     Type & Screen (If Applicable):  No results found for: LABABO, LABRH    Drug/Infectious Status (If Applicable):  No results found for: HIV, HEPCAB    COVID-19 Screening (If Applicable):   Lab Results   Component Value Date/Time    COVID19 Reactive 01/21/2021 02:45 PM           Anesthesia Evaluation  Patient summary reviewed and Nursing notes reviewed   history of anesthetic complications: PONV. Airway: Mallampati: II  TM distance: >3 FB   Neck ROM: full  Mouth opening: > = 3 FB   Dental:    (+) upper dentures and partials  Comment: Upper partial dentures  Denies loose teeth    Pulmonary: breath sounds clear to auscultation      (-) not a current smoker (ex 7.5 pk yr smoker)                          ROS comment: Sarcoidosis (stable)   Cardiovascular:  Exercise tolerance: good (>4 METS),   (+) hypertension:, hyperlipidemia      ECG reviewed  Rhythm: regular  Rate: normal           Beta Blocker:  Not on Beta Blocker      ROS comment: EKG=Normal sinus rhythm  Minimal voltage criteria for LVH, may be normal variant  otherwise normal ekg     No previous ECGs available  Confirmed by Arturo Lara ([id number]) on 1/13/2023 6:39:04 PM     Neuro/Psych:   (+) neuromuscular disease:,             GI/Hepatic/Renal:             Endo/Other:    (+) hypothyroidism::., .                  ROS comment: Covid 10/2021 Abdominal:   (+) obese,           Vascular: Other Findings:             Anesthesia Plan      [place] block     ASA 3     (Scop patch in holding)  Induction: intravenous. MIPS: Postoperative opioids intended and Prophylactic antiemetics administered. Anesthetic plan and risks discussed with patient. Plan discussed with CRNA. Attending anesthesiologist reviewed and agrees with Preprocedure content            PAT Chart Review:  Chart reviewed per routine by Carrington Badillo MD.  Above represents information available via shared medical record including previous anesthesia history, drug and allergy history.   Confirmation of above and final plan per Day of Surgery (DOS) anesthesiologist.    Carrington Badillo MD   1/30/2023

## 2023-01-30 NOTE — PROGRESS NOTES
Subjective:     Tigre Maldonado is here for followup after right carpal tunnel surgery. The patient is not having any pain. . The patient notes improvement in the following symptoms:strength, numbness, pain, sensation. The patient denies fever, wound drainage, increasing redness, pus, increasing pain, increasing swelling. Post op problems reported: none. Objective:      General :    alert, appears stated age and cooperative   Sutures:   Sutures in place and will be removed today. Incision:  healing well, no significant drainage, no dehiscence, no significant erythema   Tenderness:  none   Flexion ROM:  full range of motion   Extension ROM:  full range of motion   Effusion:  none        Assessment:     Encounter Diagnosis   Name Primary? Right carpal tunnel syndrome Yes       Plan:   Sutures removed today. Range of motion and rehabilitation exercises discussed with the patient. Follow up in 4 weeks.    Call with any questions or concerns at [phone]

## 2023-01-31 ENCOUNTER — HOSPITAL ENCOUNTER (OUTPATIENT)
Age: 63
Setting detail: OUTPATIENT SURGERY
Discharge: HOME | End: 2023-01-31
Attending: ORTHOPAEDIC SURGERY | Admitting: ORTHOPAEDIC SURGERY
Payer: COMMERCIAL

## 2023-01-31 ENCOUNTER — ANESTHESIA (OUTPATIENT)
Dept: OPERATING ROOM | Age: 63
End: 2023-01-31
Payer: COMMERCIAL

## 2023-01-31 VITALS
OXYGEN SATURATION: 98 % | DIASTOLIC BLOOD PRESSURE: 78 MMHG | WEIGHT: 229 LBS | HEART RATE: 74 BPM | HEIGHT: 64 IN | RESPIRATION RATE: 14 BRPM | TEMPERATURE: 97.9 F | SYSTOLIC BLOOD PRESSURE: 147 MMHG | BODY MASS INDEX: 39.09 KG/M2

## 2023-01-31 DIAGNOSIS — G56.02 LEFT CARPAL TUNNEL SYNDROME: Primary | ICD-10-CM

## 2023-01-31 PROCEDURE — 3600000012 HC SURGERY LEVEL 2 ADDTL 15MIN: Performed by: ORTHOPAEDIC SURGERY

## 2023-01-31 PROCEDURE — 6370000000 HC RX 637 (ALT 250 FOR IP): Performed by: ANESTHESIOLOGY

## 2023-01-31 PROCEDURE — 3700000000 HC ANESTHESIA ATTENDED CARE: Performed by: ORTHOPAEDIC SURGERY

## 2023-01-31 PROCEDURE — 6360000002 HC RX W HCPCS: Performed by: NURSE PRACTITIONER

## 2023-01-31 PROCEDURE — 2580000003 HC RX 258: Performed by: NURSE PRACTITIONER

## 2023-01-31 PROCEDURE — 3700000001 HC ADD 15 MINUTES (ANESTHESIA): Performed by: ORTHOPAEDIC SURGERY

## 2023-01-31 PROCEDURE — 3600000002 HC SURGERY LEVEL 2 BASE: Performed by: ORTHOPAEDIC SURGERY

## 2023-01-31 PROCEDURE — 2709999900 HC NON-CHARGEABLE SUPPLY: Performed by: ORTHOPAEDIC SURGERY

## 2023-01-31 PROCEDURE — 7100000011 HC PHASE II RECOVERY - ADDTL 15 MIN: Performed by: ORTHOPAEDIC SURGERY

## 2023-01-31 PROCEDURE — 2580000003 HC RX 258: Performed by: ANESTHESIOLOGY

## 2023-01-31 PROCEDURE — 64721 CARPAL TUNNEL SURGERY: CPT | Performed by: ORTHOPAEDIC SURGERY

## 2023-01-31 PROCEDURE — 2500000003 HC RX 250 WO HCPCS: Performed by: NURSE ANESTHETIST, CERTIFIED REGISTERED

## 2023-01-31 PROCEDURE — 6360000002 HC RX W HCPCS: Performed by: NURSE ANESTHETIST, CERTIFIED REGISTERED

## 2023-01-31 PROCEDURE — 7100000010 HC PHASE II RECOVERY - FIRST 15 MIN: Performed by: ORTHOPAEDIC SURGERY

## 2023-01-31 RX ORDER — SODIUM CHLORIDE, SODIUM LACTATE, POTASSIUM CHLORIDE, CALCIUM CHLORIDE 600; 310; 30; 20 MG/100ML; MG/100ML; MG/100ML; MG/100ML
INJECTION, SOLUTION INTRAVENOUS CONTINUOUS
Status: DISCONTINUED | OUTPATIENT
Start: 2023-01-31 | End: 2023-01-31 | Stop reason: HOSPADM

## 2023-01-31 RX ORDER — DIPHENHYDRAMINE HYDROCHLORIDE 50 MG/ML
12.5 INJECTION INTRAMUSCULAR; INTRAVENOUS
Status: DISCONTINUED | OUTPATIENT
Start: 2023-01-31 | End: 2023-01-31 | Stop reason: HOSPADM

## 2023-01-31 RX ORDER — HYDROCODONE BITARTRATE AND ACETAMINOPHEN 5; 325 MG/1; MG/1
1 TABLET ORAL EVERY 6 HOURS PRN
Qty: 28 TABLET | Refills: 0 | Status: SHIPPED | OUTPATIENT
Start: 2023-01-31 | End: 2023-02-07

## 2023-01-31 RX ORDER — MORPHINE SULFATE 2 MG/ML
1 INJECTION, SOLUTION INTRAMUSCULAR; INTRAVENOUS EVERY 5 MIN PRN
Status: DISCONTINUED | OUTPATIENT
Start: 2023-01-31 | End: 2023-01-31 | Stop reason: HOSPADM

## 2023-01-31 RX ORDER — OXYCODONE HYDROCHLORIDE AND ACETAMINOPHEN 5; 325 MG/1; MG/1
1 TABLET ORAL EVERY 4 HOURS PRN
Status: DISCONTINUED | OUTPATIENT
Start: 2023-01-31 | End: 2023-01-31 | Stop reason: HOSPADM

## 2023-01-31 RX ORDER — MEPERIDINE HYDROCHLORIDE 25 MG/ML
12.5 INJECTION INTRAMUSCULAR; INTRAVENOUS; SUBCUTANEOUS ONCE
Status: DISCONTINUED | OUTPATIENT
Start: 2023-01-31 | End: 2023-01-31 | Stop reason: HOSPADM

## 2023-01-31 RX ORDER — LIDOCAINE HYDROCHLORIDE 5 MG/ML
INJECTION, SOLUTION INFILTRATION; INTRAVENOUS PRN
Status: DISCONTINUED | OUTPATIENT
Start: 2023-01-31 | End: 2023-01-31 | Stop reason: SDUPTHER

## 2023-01-31 RX ORDER — SODIUM CHLORIDE 0.9 % (FLUSH) 0.9 %
5-40 SYRINGE (ML) INJECTION PRN
Status: DISCONTINUED | OUTPATIENT
Start: 2023-01-31 | End: 2023-01-31 | Stop reason: HOSPADM

## 2023-01-31 RX ORDER — PROPOFOL 10 MG/ML
INJECTION, EMULSION INTRAVENOUS CONTINUOUS PRN
Status: DISCONTINUED | OUTPATIENT
Start: 2023-01-31 | End: 2023-01-31 | Stop reason: SDUPTHER

## 2023-01-31 RX ORDER — DEXAMETHASONE SODIUM PHOSPHATE 4 MG/ML
INJECTION, SOLUTION INTRA-ARTICULAR; INTRALESIONAL; INTRAMUSCULAR; INTRAVENOUS; SOFT TISSUE PRN
Status: DISCONTINUED | OUTPATIENT
Start: 2023-01-31 | End: 2023-01-31 | Stop reason: SDUPTHER

## 2023-01-31 RX ORDER — ONDANSETRON 2 MG/ML
INJECTION INTRAMUSCULAR; INTRAVENOUS PRN
Status: DISCONTINUED | OUTPATIENT
Start: 2023-01-31 | End: 2023-01-31 | Stop reason: SDUPTHER

## 2023-01-31 RX ORDER — PROCHLORPERAZINE EDISYLATE 5 MG/ML
5 INJECTION INTRAMUSCULAR; INTRAVENOUS
Status: DISCONTINUED | OUTPATIENT
Start: 2023-01-31 | End: 2023-01-31 | Stop reason: HOSPADM

## 2023-01-31 RX ORDER — DIPHENHYDRAMINE HYDROCHLORIDE 50 MG/ML
INJECTION INTRAMUSCULAR; INTRAVENOUS PRN
Status: DISCONTINUED | OUTPATIENT
Start: 2023-01-31 | End: 2023-01-31 | Stop reason: SDUPTHER

## 2023-01-31 RX ORDER — SODIUM CHLORIDE 0.9 % (FLUSH) 0.9 %
5-40 SYRINGE (ML) INJECTION EVERY 12 HOURS SCHEDULED
Status: DISCONTINUED | OUTPATIENT
Start: 2023-01-31 | End: 2023-01-31 | Stop reason: HOSPADM

## 2023-01-31 RX ORDER — FENTANYL CITRATE 50 UG/ML
INJECTION, SOLUTION INTRAMUSCULAR; INTRAVENOUS PRN
Status: DISCONTINUED | OUTPATIENT
Start: 2023-01-31 | End: 2023-01-31 | Stop reason: SDUPTHER

## 2023-01-31 RX ORDER — HYDROCODONE BITARTRATE AND ACETAMINOPHEN 5; 325 MG/1; MG/1
1 TABLET ORAL EVERY 6 HOURS PRN
Status: DISCONTINUED | OUTPATIENT
Start: 2023-01-31 | End: 2023-01-31 | Stop reason: HOSPADM

## 2023-01-31 RX ORDER — MIDAZOLAM HYDROCHLORIDE 1 MG/ML
INJECTION INTRAMUSCULAR; INTRAVENOUS PRN
Status: DISCONTINUED | OUTPATIENT
Start: 2023-01-31 | End: 2023-01-31 | Stop reason: SDUPTHER

## 2023-01-31 RX ORDER — SCOLOPAMINE TRANSDERMAL SYSTEM 1 MG/1
1 PATCH, EXTENDED RELEASE TRANSDERMAL
Status: DISCONTINUED | OUTPATIENT
Start: 2023-01-31 | End: 2023-01-31 | Stop reason: HOSPADM

## 2023-01-31 RX ORDER — FENTANYL CITRATE 0.05 MG/ML
50 INJECTION, SOLUTION INTRAMUSCULAR; INTRAVENOUS EVERY 5 MIN PRN
Status: DISCONTINUED | OUTPATIENT
Start: 2023-01-31 | End: 2023-01-31 | Stop reason: HOSPADM

## 2023-01-31 RX ORDER — SODIUM CHLORIDE 9 MG/ML
INJECTION, SOLUTION INTRAVENOUS PRN
Status: DISCONTINUED | OUTPATIENT
Start: 2023-01-31 | End: 2023-01-31 | Stop reason: HOSPADM

## 2023-01-31 RX ADMIN — DEXAMETHASONE SODIUM PHOSPHATE 4 MG: 4 INJECTION, SOLUTION INTRAMUSCULAR; INTRAVENOUS at 07:44

## 2023-01-31 RX ADMIN — PROPOFOL 75 MCG/KG/MIN: 10 INJECTION, EMULSION INTRAVENOUS at 07:13

## 2023-01-31 RX ADMIN — HYDROCODONE BITARTRATE AND ACETAMINOPHEN 1 TABLET: 5; 325 TABLET ORAL at 08:11

## 2023-01-31 RX ADMIN — CEFAZOLIN 2000 MG: 2 INJECTION, POWDER, FOR SOLUTION INTRAMUSCULAR; INTRAVENOUS at 07:11

## 2023-01-31 RX ADMIN — ONDANSETRON 4 MG: 2 INJECTION INTRAMUSCULAR; INTRAVENOUS at 07:08

## 2023-01-31 RX ADMIN — MIDAZOLAM 2 MG: 1 INJECTION INTRAMUSCULAR; INTRAVENOUS at 07:08

## 2023-01-31 RX ADMIN — SODIUM CHLORIDE, POTASSIUM CHLORIDE, SODIUM LACTATE AND CALCIUM CHLORIDE: 600; 310; 30; 20 INJECTION, SOLUTION INTRAVENOUS at 06:17

## 2023-01-31 RX ADMIN — LIDOCAINE HYDROCHLORIDE 50 MG: 5 INJECTION, SOLUTION INFILTRATION at 07:18

## 2023-01-31 RX ADMIN — FENTANYL CITRATE 50 MCG: 50 INJECTION INTRAMUSCULAR; INTRAVENOUS at 07:24

## 2023-01-31 RX ADMIN — DIPHENHYDRAMINE HYDROCHLORIDE 12.5 MG: 50 INJECTION, SOLUTION INTRAMUSCULAR; INTRAVENOUS at 07:13

## 2023-01-31 ASSESSMENT — PAIN - FUNCTIONAL ASSESSMENT: PAIN_FUNCTIONAL_ASSESSMENT: 0-10

## 2023-01-31 ASSESSMENT — PAIN DESCRIPTION - LOCATION
LOCATION: HAND
LOCATION: HAND

## 2023-01-31 ASSESSMENT — PAIN DESCRIPTION - ORIENTATION
ORIENTATION: LEFT
ORIENTATION: LEFT

## 2023-01-31 ASSESSMENT — PAIN SCALES - GENERAL
PAINLEVEL_OUTOF10: 10
PAINLEVEL_OUTOF10: 10

## 2023-01-31 ASSESSMENT — PAIN DESCRIPTION - DESCRIPTORS
DESCRIPTORS: ACHING
DESCRIPTORS: ACHING

## 2023-01-31 NOTE — DISCHARGE INSTRUCTIONS
[id number] [facility]  [phone]  Jr Sky D.O.          Keep dressing clean and dry for 7 days. Change your operative dressing on post-op day #7. Use bandaids over incision site. Keep hand elevated as much as possible over the next 24-48 hours. Use ice to operative site about 20 min out of every hour. This helps reduce pain and swelling. You may shower with soap and water on post-op day #1 but keep your incision/dressing clean and dry. After 7 days when dressing is removed, may shower and let water run over incision site. Do not scrub over area. Non-weight bearing activities with affected hand until post-op appointment. No lifting over 5 pounds. May begin moving fingers immediately. No heavy lifting. No pushing or pulling. Take pain medications as prescribed. If you anticipate the need for a refill on your medication and the weekend is approaching, please call the office by noon on Friday. No refills will be called in over the weekend. DO NOT take Tylenol products while taking prescribed pain medicine. Resume regular diet and medications (unless otherwise directed by your doctor). You should have a responsible adult with you for 24 hours. If you have any questions or concerns, please call the office. If any problems occur or if you have any further questions, please call your doctor as soon as possible. If you find that you cannot reach your doctor but feel that your condition needs a doctors attention go to the emergency room. Infection After Surgery: Care Instructions  Overview  After surgery, an infection is always possible. It doesn't mean that the surgery didn't go well. Because an infection can be serious, your doctor has taken steps to manage it. Your doctor checked the infection and cleaned it if necessary.  Your doctor may have made an opening in the area so that the pus can drain out. You may have gauze in the cut so that the area will stay open and keep draining. You may need antibiotics. You will need to follow up with your doctor to make sure the infection has gone away. Follow-up care is a key part of your treatment and safety. Be sure to make and go to all appointments, and call your doctor if you are having problems. It's also a good idea to know your test results and keep a list of the medicines you take. How can you care for yourself at home? Make sure your surgeon knows about the infection, especially if you saw another doctor about your symptoms. If your doctor prescribed antibiotics, take them as directed. Do not stop taking them just because you feel better. You need to take the full course of antibiotics. Ask your doctor if you can take an over-the-counter pain medicine, such as acetaminophen (Tylenol), ibuprofen (Advil, Motrin), or naproxen (Aleve). Be safe with medicines. Read and follow all instructions on the label. Do not take two or more pain medicines at the same time unless the doctor told you to. Many pain medicines have acetaminophen, which is Tylenol. Too much acetaminophen (Tylenol) can be harmful. Prop up the area on a pillow anytime you sit or lie down during the next 3 days. Try to keep it above the level of your heart. This will help reduce swelling. Keep the skin clean and dry. You may have a bandage over the cut (incision). A bandage helps the incision heal and protects it. Your doctor will tell you how to take care of this. Keep it clean and dry. You may have drainage from the wound. If your doctor told you how to care for your incision, follow your doctor's instructions. If you did not get instructions, follow this general advice:  Wash around the incision with clean water 2 times a day. Don't use hydrogen peroxide or alcohol, which can slow healing. When should you call for help?    Call your doctor now or seek immediate medical care if:    You have signs that your infection is getting worse, such as: Increased pain, swelling, warmth, or redness in the area. Red streaks leading from the area. Pus draining from the wound. A new or higher fever. Watch closely for changes in your health, and be sure to contact your doctor if you have any problems. Where can you learn more? Go to http://www.woods.com/ and enter C340 to learn more about \"Infection After Surgery: Care Instructions. \"  Current as of: January 20, 2022               Content Version: 13.5  © 7585-8540 [employer]. Care instructions adapted under license by [facility] ([facility]). If you have questions about a medical condition or this instruction, always ask your healthcare professional. [address] any warranty or liability for your use of this information. Nausea and Vomiting After Surgery: Care Instructions  Your Care Instructions     After you've had surgery, you may feel sick to your stomach (nauseated) or you may vomit. Sometimes anesthesia can make you feel sick. It's a common side effect and often doesn't last long. Pain also can make you feel sick or vomit. After the anesthesia wears off, you may feel pain from the incision (cut). That pain could then upset your stomach. Taking pain medicine can also make you feel sick to your stomach. Whatever the cause, you may get medicine that can help. There are also some things you can do at home to prevent nausea and feel better. The doctor has checked you carefully, but problems can develop later. If you notice any problems or new symptoms, get medical treatment right away. Follow-up care is a key part of your treatment and safety. Be sure to make and go to all appointments, and call your doctor if you are having problems. It's also a good idea to know your test results and keep a list of the medicines you take. How can you care for yourself at home? Be safe with medicines.  Read and follow all instructions on the label. If the doctor gave you a prescription medicine for pain, take it as prescribed. If you are not taking a prescription pain medicine, ask your doctor if you can take an over-the-counter medicine. Take your pain medicine as soon as you have pain. It works better if you take it before the pain gets bad. Call your doctor if you have any problems with your medicine. Rest in bed until you feel better. To prevent dehydration, drink plenty of fluids. Choose water and other clear liquids until you feel better. If you have kidney, heart, or liver disease and have to limit fluids, talk with your doctor before you increase the amount of fluids you drink. When you are able to eat, try clear soups, mild foods, and liquids until all symptoms are gone for 12 to 48 hours. Other good choices include dry toast, crackers, cooked cereal, and gelatin dessert, such as Jell-O. Do not smoke. Smoking and being around smoke can make nausea worse. If you need help quitting, talk to your doctor about stop-smoking programs and medicines. These can increase your chances of quitting for good. When should you call for help? Call 911  anytime you think you may need emergency care. For example, call if:    You passed out (lost consciousness). Call your doctor now or seek immediate medical care if:    You have new or worse nausea or vomiting. You are too sick to your stomach to drink any fluids. You cannot keep down fluids. You have symptoms of dehydration, such as:  Dry eyes and a dry mouth. Passing only a little urine. Feeling thirstier than usual.     Your pain medicine is not helping. You are dizzy or lightheaded, or you feel like you may faint. Watch closely for changes in your health, and be sure to contact your doctor if:    You do not get better as expected. Current as of: March 9, 2022               Content Version: 13.5  © 2006-2022 [employer], [facility].    Care instructions adapted under license by [facility] ([facility]). If you have questions about a medical condition or this instruction, always ask your healthcare professional. [address] any warranty or liability for your use of this information.

## 2023-01-31 NOTE — ANESTHESIA PROCEDURE NOTES
Peripheral Block    Patient location during procedure: OR  Reason for block: primary anesthetic  Start time: 1/31/2023 7:15 AM  End time: 1/31/2023 7:19 AM  Staffing  Anesthesiologist: Radha Gibson MD  Resident/CRNA: FERNANDO Og CRNA  Preanesthetic Checklist  Completed: patient identified, IV checked, site marked, risks and benefits discussed, surgical/procedural consents, equipment checked, timeout performed, anesthesia consent given, oxygen available, monitors applied/VS acknowledged and fire risk safety assessment completed and verbalized  Peripheral Block   Patient position: supine  Prep: alcohol swabs  Provider prep: mask  Patient monitoring: cardiac monitor, continuous pulse ox, continuous capnometry, IV access, frequent blood pressure checks and oxygen  Block type: [place] block  Laterality: left  Injection technique: single-shot  Guidance: other  Local infiltration: lidocaine  Infiltration strength: 0.5 %  Local infiltration: lidocaine  Dose: 50 mL    Needle   Needle type:  Other   Needle gauge: 22 G  Needle localization: anatomical landmarks  Assessment   Injection assessment: no paresthesia on injection  Paresthesia pain: immediately resolved  Slow fractionated injection: no  Hemodynamics: stableno  Outcomes: uncomplicated

## 2023-01-31 NOTE — PROGRESS NOTES
Pt denies complaint of nausea. States she is ready to go home. Pt states pain pill has not started working yet but she did well with the same pain pill on her previous surgery a couple weeks ago.

## 2023-01-31 NOTE — OP NOTE
Operative Note      Patient: Gunner Shrestha  YOB: 1960  MRN: [id number]    Date of Procedure: 1/31/2023    Pre-Op Diagnosis: Carpal tunnel syndrome on left [G56.02]    Post-Op Diagnosis: Same       Procedure(s):  LEFT CARPAL TUNNEL RELEASE- 1/31/23    Surgeon(s): Jose Roberto Webster DO    Assistant:   * No surgical staff found *    Anesthesia: [place] Block    Estimated Blood Loss (mL): less than 50     Complications: None    Specimens:   * No specimens in log *    Implants:  * No implants in log *      Drains: * No LDAs found *    Findings: as above    Detailed Description of Procedure:   below    SURGEON: [facility], D.O.   ASSISTANT: none  PREOPERATIVE DIAGNOSIS: Left wrist carpal tunnel syndrome. POSTOPERATIVE DIAGNOSIS: Left wrist carpal tunnel syndrome. PROCEDURE: Release transverse carpal ligament, Left wrist.   ANESTHESIA: bb  ESTIMATED BLOOD LOSS: minimal in degree  COMPLICATIONS: None. Brief Hospital Course: The  patients well  known to Luisa Jansen DO's practice with persistent complaints of left wrist/hand pain and numbness. Wrsit and hand pain has failed to be relieved by non-operative conservative measures, and has began affecting daily activities of living. After examination of the patient, review of the EMG, radiologic studies, and appropriate pre-operative risk assessment, Luisa Jansen DO recommended left carpal tunnel release,  which the patient was agreeable towards. OPERATIVE PROCEDURE: The patient was brought to the operating suite and was   given anesthesia. The left arm was identified with a   preoperative time-out, the arm was prepped and draped in sterile fashion, I  outlined incision along the volar side of the wrist just ulnar to the thenar   wrist crease. I made an approximately 2 to 4-cm incision over this area through the skin   and subcutaneous tissue. I dissected that down to the level of the palmar   fascia.  It was identified and I released it sharply. I Identified the   transverse carpal ligament and incised this with a 15-blade. Using tenotomy   scissors, I finished the release of the ligament proximally and distally to its full extent. I thoroughly irrigated the wound upon closure. I closed the incision with 4-0 Prolene in a horizontal mattress type fashion. Sterile dressing was placed on the wound. The patient recovered to the   recovery room without difficulty.       Electronically signed by Ninoska Clayton DO on 1/31/2023 at 6:38 AM

## 2023-01-31 NOTE — H&P
Updated H&P    Chief Complaint   Patient presents with    Carpal Tunnel       Bilateral Hand F/U, R>L         Melissa Srivastava is a 57y.o. year old   @female@ who presents today for a carpal tunnel follow up. R>L     Past Medical History           Past Medical History:   Diagnosis Date    H/O cardiovascular stress test 01/11/2017     Lexiscan    HTN (hypertension)      Hypothyroid      Organic insomnia      Persistent disorder of initiating or maintaining sleep      Sarcoidosis           Past Surgical History   No past surgical history on file. Current Medication      Current Outpatient Medications:     potassium chloride (K-TAB) 10 MEQ extended release tablet, Take 1 tablet by mouth daily, Disp: 30 tablet, Rfl: 0    amLODIPine (NORVASC) 5 MG tablet, TAKE 1 TABLET BY MOUTH ONE TIME A DAY, Disp: 90 tablet, Rfl: 1    ergocalciferol (ERGOCALCIFEROL) 1.25 MG (28265 UT) capsule, weekly, Disp: 12 capsule, Rfl: 1    levothyroxine (SYNTHROID) 50 MCG tablet, Take 1 tablet by mouth daily, Disp: 90 tablet, Rfl: 1    olmesartan-hydroCHLOROthiazide (BENICAR HCT) 40-25 MG per tablet, Take 1 tablet by mouth daily, Disp: 90 tablet, Rfl: 1    zolpidem (AMBIEN) 10 MG tablet, Take 1 tablet by mouth nightly as needed for Sleep for up to 90 days. , Disp: 90 tablet, Rfl: 0    albuterol sulfate HFA (PROVENTIL HFA) 108 (90 Base) MCG/ACT inhaler, Inhale 2 puffs into the lungs 4 times daily as needed for Wheezing, Disp: 1 each, Rfl: 2              Allergies   Allergen Reactions    Doxycycline Hives      Social History                   Socioeconomic History    Marital status:        Spouse name: Not on file    Number of children: Not on file    Years of education: Not on file    Highest education level: Not on file   Occupational History    Not on file   Tobacco Use    Smoking status: Former       Packs/day: 0.25       Years: 15.00       Pack years: 3.75       Types: Cigarettes       Quit date: 9/22/2000 Years since quittin.2    Smokeless tobacco: Never   Vaping Use    Vaping Use: Never used   Substance and Sexual Activity    Alcohol use: Yes       Comment: socially. No coffee for a week     Drug use: No    Sexual activity: Yes       Partners: Male   Other Topics Concern    Not on file   Social History Narrative    Not on file      Social Determinants of Health            Financial Resource Strain: Low Risk     Difficulty of Paying Living Expenses: Not hard at all   Food Insecurity: No Food Insecurity    Worried About Running Out of Food in the Last Year: Never true    Ran Out of Food in the Last Year: Never true   Transportation Needs: Not on file   Physical Activity: Not on file   Stress: Not on file   Social Connections: Not on file   Intimate Partner Violence: Not on file   Housing Stability: Not on file         Family History             Family History   Problem Relation Age of Onset    Kidney Disease Mother      Breast Cancer Mother 48    Cancer Father              REVIEW OF SYSTEMS:      General/Constitution:  (-)weight loss, (-)fever, (-)chills, (-)weakness. Skin: (-) rash,(-) psoriasis,(-) eczema, (-)skin cancer. Musculoskeletal: (-) fractures,  (-) dislocations,(-) collagen vascular disease, (-) fibromyalgia, (-) multiple sclerosis, (-) muscular dystrophy, (-) RSD,(-) joint pain (-)swelling, (-) joint pain,swelling. Neurologic: (-) epilepsy, (-)seizures,(-) brain tumor,(-) TIA, (-)stroke, (-)headaches, (-)Parkinson disease,(-) memory loss, (-) LOC. Cardiovascular: (-) Chest pain, (-) swelling in legs/feet, (-) SOB, (-) cramping in legs/feet with walking. Respiratory: (-) SOB, (-) Coughing, (-) night sweats. GI: (-) nausea, (-) vomiting, (-) diarrhea, (-) blood in stool, (-) gastric ulcer. Psychiatric: (-) Depression, (-) Anxiety, (-) bipolar disease, (-) Alzheimer's Disease  Allergic/Immunologic: (-) allergies latex, (-) allergies metal, (-) skin sensitivity.   Hematlogic: (-) anemia, (-) blood transfusion, (-) DVT/PE, (-) Clotting disorders        SUBJECTIVE:         Vital signs are stable. In general, patient is awake, alert and oriented X3, in no apparent distress. Examination of HENT reveals normocephalic, atraumatic. PERRLA/EOMI sclera are white. Conjunctivae are clear. TM's are intact. Pharynx is pink and moist.  Uvula and tongue are midline. Heart: Positive S1 and positive S2 with regular rate and rhythm. Lungs: Clear to auscultation bilaterally without rales, rhonchi or wheezes. Abdomen: soft, nontender. Positive bowel sounds. No organomegaly. No guarding or rigidity. Constitution:     /86   Pulse 67   Temp 98 °F (36.7 °C) (Skin)   Resp 16   Ht 5' 4\" (1.626 m)   Wt 229 lb (103.9 kg)   SpO2 97%   BMI 39.31 kg/m²           Psycihatric:     The patient is alert and oriented x 3, appears to be stated age and in no distress. Respiratory:     Respiratory effort is not labored. Patient is not gasping. Palpation of the chest reveals no tactile fremitus. Skin:     Upon inspection: the skin appears warm, dry and intact. There is not a previous scar over the affected area. There is not any cellulitis, lymphedema or cutaneous lesions noted in the lower extremities. Upon palpation there is no induration noted. Neurologic:     Gait: normal;  Motor exam of the upper extremities show: The reflexes in biceps/triceps/brachioradialis are equal and symmetric. Sensory exam C5-T1 are normal bilaterally. Cardiovascular: The vascular exam is normal and is well perfused to distal extremities. There are 2+ radial pulses bilaterally, and motor and sensation is intact to median, ulnar, and radial, musclocutaneus, and axillary nerve distribution and grossly symmetric bilaterally. There is cap refill noted less than two seconds in all digits. There is not edema of the bilateral upper extremities.   There is not varicosities noted in the distal extremities. Lymph:     Upon palpation,  there is no lymphadenopathy noted in bilateral upper extremities. Musculoskeletal:     Cervical Exam:     On physical exam, Breanne Martinez is well-developed, well-nourished, oriented to person, place and time. her gait is normal.  On evaluation of her cervical spine, she has full range of motion of the cervical spine without pain. There is no cervical tenderness to palpation. Shoulder Exam:     On evaluation of her bilaterally upper extremities, her bilateral shoulder has no deformity. There is not evidence of scapular dyskinesis. There is not muscle atrophy in shoulder girdle. The range of motion for the Right Shoulder is 150/45/t10 and for the Left shoulder is 150/45/t10. Right shoulder Motor strength is 5/5 in the supraspinatus, 5/5 internal rotation and 5/5 in external rotation, and Left shoulder motor strength 5/5 in supraspinatus, 5/5 in internal rotation, 5/5 in external rotation. Elbow exam:     Evaluation of the elbow, reveals no signs of swelling or deformity. ROM is 0-140. There is not instability with varus/valgus stresses. Motor strength is 5/5 with flexion/extension. Wrist exam:       Inspection of the bilateral upper extremities, there is no evidence of deformity of the wrist.  ROM Wrist ROM R wrist DF 90, VF 90, L wrist DF 90, VF 90, R pronation 90/ supination 90, L pronation 90/supination 90. Motor strength is 5/5 with Dorsiflexion/Volarflexion/Supination/Pronation.  strength 4/5. Thenar eminence appears bilaterally symmetrical.   Motor and sensation is intact and symmetric throughout the bilateral upper extremities in the  ulnar and radial , musclcutaneous, and axillary nerve distributions. There is paresthesia in the median nerve distribution of the right hand. Hand exam:     The skin overlying the hand is  intact. There is not evidence of scar, lesion, laceration, or abrasion.   The motion in the small joints of the hand are intact with no stiffness or deformity. The ROM in the MCP flexion 90/ extension 0 , PIP flexion 90/ extension 0, DIP flexion 70/ extension 0. There is not rotational deformity. There is no masses or adenopathy in bilateral upper extremities. Radial pulses are 2+ and symmetric bilaterally. Capillary refill is intact and < 2 seconds. Motor strength is 5/5 with flexion and extension of the small finger joints. Right:  Phallens sign(+), Tinnells sign (+), Median nerve compression test (+),  Finklesteins (-), [employer] Grind test (-), [employer](-). Left:  Phallens sign(+), Tinnells sign (+), Median nerve compression test (+),  Finklesteins (-), [employer] Grind test (-), [employer](-). X-rays:  n/a     EMG:  This study was abnormal.       Electrodiagnosis: There is electrodiagnostic evidence of a median mononeuropathy. \" Location: bilateral at the wrist.   \" Nature: [  ] Axonal   [ X ] Demyelinating  [  ] Mixed axonal and demyelinating                      [  ] Sensory [  ] Motor               [ X ] Mixed sensorimotor                      [  ] with active denervation       Tullia.Crews ] without active denervation   \" Duration: Acute   \" Severity: moderate   \" Prognosis: Good. The prognosis for recovery of demyelinating lesions is good if the cause is alleviated. Previous Study: no         Follow up EMG is recommended if no surgical intervention and symptoms persist in one year        Impression:           Encounter Diagnoses   Name Primary? Right carpal tunnel syndrome Yes    Left carpal tunnel syndrome              Plan: Natural history and expected course discussed. Questions answered. Educational materials distributed. Rest, ice, compression, and elevation (RICE) therapy. Reduction in offending activity discussed. Volar wrist splint to be worn at night. I had a lengthy discussion with the patient regarding their diagnosis.  I explained treatment options including surgical vs non surgical treatment. I reviewed in detail the risks and benefits and outlined the procedure in detail with expected outcomes and possible complications. I also discussed non surgical treatment such as injections (CSI and visco supplementation), physical therapy, topical creams and NSAID's. They have elected for surgical management at this time. We will perform a left carpal tunnel release on 1/31/22. The risks and benefits were reviewed with the patient such as:  DVT, infection,  injuries to blood vessels and nerves, non relief of symptoms, continued pain, worsening of symptoms.

## 2023-01-31 NOTE — ANESTHESIA POSTPROCEDURE EVALUATION
Department of Anesthesiology  Postprocedure Note    Patient: Diego Duran  MRN: [id number]  YOB: 1960  Date of evaluation: 1/31/2023      Procedure Summary     Date: 01/31/23 Room / Location: [address] [place] [address] 01 / [facility]    Anesthesia Start: [address] [facility] Anesthesia Stop: 8172    Procedure: LEFT CARPAL TUNNEL RELEASE- 1/31/23 (Left: Hand) Diagnosis:       Carpal tunnel syndrome on left      (Carpal tunnel syndrome on left [G56.02])    Surgeons: Elmer Grewal DO Responsible Provider: Fern Wing MD    Anesthesia Type: [place] block, MAC ASA Status: 3          Anesthesia Type: [place] block, MAC    Dominga Phase I: Dominga Score: 10    Dominga Phase II: Dominga Score: 10      Anesthesia Post Evaluation    Patient location during evaluation: PACU  Patient participation: complete - patient participated  Level of consciousness: awake  Airway patency: patent  Nausea & Vomiting: no nausea and no vomiting  Complications: no  Cardiovascular status: hemodynamically stable  Respiratory status: room air and spontaneous ventilation  Hydration status: stable

## 2023-02-15 ENCOUNTER — OFFICE VISIT (OUTPATIENT)
Dept: ORTHOPEDIC SURGERY | Age: 63
End: 2023-02-15

## 2023-02-15 VITALS — TEMPERATURE: 98 F | BODY MASS INDEX: 39.09 KG/M2 | WEIGHT: 229 LBS | HEIGHT: 64 IN

## 2023-02-15 DIAGNOSIS — G56.01 RIGHT CARPAL TUNNEL SYNDROME: Primary | ICD-10-CM

## 2023-02-15 DIAGNOSIS — G56.02 LEFT CARPAL TUNNEL SYNDROME: ICD-10-CM

## 2023-02-15 PROCEDURE — 99024 POSTOP FOLLOW-UP VISIT: CPT | Performed by: NURSE PRACTITIONER

## 2023-02-15 NOTE — PROGRESS NOTES
Subjective:     Angela Anderson is here for followup after left carpal tunnel surgery. The patient is not having any pain. The patient notes improvement in the following symptoms: strength, numbness, pain, sensation. The patient denies fever, wound drainage, increasing redness, pus, increasing pain, increasing swelling. Post op problems reported: none. Objective:       General :    alert, appears stated age and cooperative   Sutures:   Sutures in place and will be removed today. Incision:  healing well, no significant drainage, no dehiscence, no significant erythema   Tenderness:  none   Flexion ROM:  full range of motion   Extension ROM:  full range of motion   Effusion:  none        Assessment:     Encounter Diagnoses   Name Primary? Right carpal tunnel syndrome Yes    Left carpal tunnel syndrome      Plan:   Sutures removed today. Range of motion and rehabilitation exercises discussed with the patient. HEP  OT  Follow up in 4 weeks.   Call with any questions or concerns at [phone]

## 2023-02-28 NOTE — PROGRESS NOTES
OCCUPATIONAL THERAPY INITIAL EVALUATION    [address] THERAPY   Naman Bryant [address] [facility] [place]  Dept: [phone]  Loc: [phone]   [facility] OT Fax: [phone]    Date:  3/1/2023  Initial Evaluation Date: 3/1/23     Evaluating Therapist: Cassi Rosales OT/L  [id number]    Patient Name:  Afshan Short    :  1960    Restrictions/Precautions:   Activity as tolerated, Low fall risk  Diagnosis:    G56.01 (ICD-10-CM) - Right carpal tunnel syndrome   G56.02 (ICD-10-CM) - Left carpal tunnel syndrome          Date of Surgery/Injury: R CTR (23) / L CTR (46)    Insurance/Certification information:  [address] of care signed (Y/N): N  Visit# / total visits: 1 / up to 12    Referring Practitioner:  Dr David Hanley  Specific Practitioner Orders: OT evaluate and treat    Assessment of current deficits   [] Functional mobility  [x] ADLs  [x] Strength   [] Cognition   [] Functional transfers   [x] IADLs  [] Safety Awareness  [] Endurance   [] Fine Motor Coordination  [] Balance  [] Vision/perception  [] Sensation    [] Gross Motor Coordination [x] ROM  [x] Pain   [x] Edema    [x] Scar Adhesion/Skin Integrity     OT PLAN OF CARE   OT POC based on physician orders, patient diagnosis and results of clinical assessment    Frequency/Duration: 1-2x/ week x 6 weeks  Specific OT Treatment to include:     [x] Instruction in HEP                   Modalities:  [x] Therapeutic Exercise        [x] Ultrasound               [] Electrical Stimulation/Attended  [x] PROM/Stretching                    [x] Fluidotherapy          [x]  Paraffin                   [x] AAROM  [x] AROM                 [] Iontophoresis:   [x] Tendon Glides                                               [] Neuromuscular Re-Ed            [] ADL/IADL re-training    [x] Therapeutic Activity       [x] Pain Management with/without modalities PRN                 [x] Manual Therapy [] Splinting                      [x] Scar Management                   []Joint Protection/Training  []Ergonomics                             [] Joint Mobilization        [] Adaptive Equipment Assessment/Training                             [x] Manual Edema Mobilization   [] Myofascial Release                 [] Energy Conservation/Work Simplification  [] GM/FM Coordination       [] Safety retraining/education per  individual diagnosis/goals  [x] Desensitization       Patient Specific Goal: To go back to work and use my hands well                             GOALS (Long term same as Short term):  1) Patient will demonstrate good understanding of home program (exercises/activities/diagnosis/prognosis/goals) with good accuracy. 2) Patient will demonstrate increased active/passive range of motion of their both wrists by at least 10* for improved ADL/IADL completion including toileting and bathing. 3) Patient will demonstrate increased /pinch strength of at least 10 / 3-5 pinch pounds of their both hands. 4) Patient to report decreased pain in their affected both distal upper extremities from 5-7/10 with light use to 3/10 or less with resistive functional use. 5) Patient will be knowledgeable of edema control techniques as evident with decreases from moderate to mild/none in both hands. 6) Patient will demonstrate a non-tender/non-adherent scar. 7) Patient will report ADL/ IADL functions as Mod I/I using the hand effectively including typing/ writing for work and meal prep and ability to open containers. Jose Patel 8) Patient will decrease QuickDASH score to 30% or less for increased participation in daily functional activities.        Past Medical History:   Past Medical History:   Diagnosis Date    COVID-19 10/2021    muscle aches coughing HA    H/O cardiovascular stress test 01/11/2017    Lexiscan    neg per pt    HTN (hypertension)     Hyperlipidemia     Hypothyroid     Organic insomnia PONV (postoperative nausea and vomiting)     Sarcoidosis     bilat lungs  (remission x 15 yrs)     Past Surgical History:   Past Surgical History:   Procedure Laterality Date    BRONCHOSCOPY      done for DX of sarcoidosis    CARPAL TUNNEL RELEASE Right 1/17/2023    RIGHT WRIST CARPAL TUNNEL RELEASE- 1/17/23 performed by Tato Patel DO at [facility] ER Left 1/31/2023    LEFT CARPAL TUNNEL RELEASE- 1/31/23 performed by Tato Patel DO at [address])         Reason for Referral: Pt presents with a Hx of B CTS requiring surgery as follows:    Date of Procedure: 1/17/2023     Pre-Op Diagnosis: Carpal tunnel syndrome on right [G56.01]  Procedure(s):  RIGHT WRIST CARPAL TUNNEL RELEASE- 1/17/23    Date of Procedure: 1/31/2023  Pre-Op Diagnosis: Carpal tunnel syndrome on left [G56.02]   Procedure(s):  LEFT CARPAL TUNNEL RELEASE- 1/31/23    Pt cc: is R wrist weakness, decreased functional use, B wrist swelling and bilateral hand  pain. The incisions on both hands are well healed with tenderness (L hand) and hypersensitivity (R hand) to touch. Pt reports the R hand hurts more than the left and has a lower tolerance for use. Home Living:   Prior Level of Function: Independent    Cognition:   Alert/Oriented x3     IADL STATUS:   Ind Mod I Min A Mod A Max A Dep Other   Homemaking Responsibility    x   Spouse doing cooking/ pt able to do light cleaning only   Shopping Responsibility:   x    A for lifting   Mode of Transportation: car  x     Uses L>R   Leisure & Hobbies:gardening      x    Work: Medical administration      x Pt reports diff tying and writing- RTW planned for next week     Comments:Pt states she tries to do tasks around the house but resorts to switching hand to hand due to pain. Difficulties are reported with opening tight containers and lifting moderate to heavy items.      ADL STATUS:   Ind Mod I Min A Mod A Max A Dep Other   Feeding: x         Grooming:  x     Decreased ease using curling iron   Bathing:  x        UE Dressing: x         LE Dressing: x         Toileting:  x        Transfers: x           Comments: Pt reports bathing and brushing teeth is very painful and requires frequent breaks. Pain Level:   -Pain in the R hand/ wrist 5-7/10/ nerve pains that ^ with use. -Pain in the left wrist/ hand averages 3/10/ sore    UE Assessment: RHD    R AROM  3-1-23     Wrist flexion  0-70     Wrist extension  0-37     RD  0-3     UD  0-50    Tendon glides  Very tight and discomfort      L AROM  3-1-23     Wrist flexion  0-38     Wrist extension  0-42     RD  0-30     UD  0-60     Tendon glides  WNL      Hand strength  3-1-23     R   28#     R lateral pinch  3.5# with pain     R 3 point pinch  3# with pain          L   NT     L lateral pinch  NT     L 3 point pinch  NT      Sensation: B- No reports of numbness or tingling      Edema Description/Circumferential Measurements: Moderate edema is present at the base of both hands and into the left volar wrist.      Coordination: WNL with no focal deficits    Quick Dash: 57% disability    Intervention: Tx started with a focus on the following:  - scar massage/ to do at home 3x/ day  - Recommended use of CT scar pad (Provided today) in wrist brace for work  - Edema gloves provided (medium) for night use and during the day as needed. - Home exercises ( tendon glides, prayer stretch)  - Discussed use of thermal modalities  Pt is scheduled for gradual RTW starting next week. Pt will call to schedule next OT visits once she knows her availability.  (Therapeutic activity- 20)       Eval Complexity: Low  Profile and History- Bayron Harrison MD notes, op notes  Assessment of Occupational Performance and Identification of Deficits- 7 performance deficits   Clinical Decision Making-  modifications required/ co-morbidity arthritis    Rehab Potential: [x] Good  [] Fair  [] Poor        Suggested Professional Referral:       [x] No  [] Yes:  Barriers to Goal Achievement[de-identified]          [x] No  [] Yes:  Domestic Concerns:                           [x] No  [] Yes:       Patient. Education:  [x] Plans/Goals, Risks/Benefits discussed  [x] Home exercise program  Method of Education: [x] Verbal  [x] Demo  [] Written  Comprehension of Education:  [x] Verbalizes understanding. [x] Demonstrates understanding. [x] Needs Review. [] Demonstrates/verbalizes understanding of HEP/Ed previously given. Patient understands diagnosis/prognosis and consents to treatment, plan and goals: [x] Yes    [] No     Time In: 1305            Time Out: 1400                      Timed Code Treatment Minutes: 55 minutes    CODE  Minutes  Units   58438 OT Eval Low 35 1   28536 OT Eval Medium     65627 OT Eval High     59157 Fluidotherapy     81406 Manual     98453 Therapeutic Ex     06234 Therapeutic Activity 20 1   16245 ADL/COMP Tech Train     [address] Neuromuscular Re-Ed     63022 OrthoManagementTraining     71521 Paraffin     91275 Electrical Stim - Attended     [id number] Iontophoresis     35419 Ultrasound      Other                Electronically signed by: Itz Seo OT /OSCAR  [id number]     [id number] Certification / Comments      Frequency/Duration 1-2x / week for up to 12 visits. Certification period From: 3-1-23  To: 6-1-23     I have reviewed the Plan of Care established for skilled therapy services and certify that the services are required and that they will be provided while the patient is under my care.      Physician's Comments/Revisions:           Physicians's Printed Name:  Dr Leeann Miller                                   Physician's Signature:                                                               Date:      Please review Patient's OT evaluation and if you agree sign/date and fax back to us at our [place] [address] OT Fax: [phone].  Thank you for your referral!

## 2023-03-01 ENCOUNTER — EVALUATION (OUTPATIENT)
Dept: OCCUPATIONAL THERAPY | Age: 63
End: 2023-03-01

## 2023-03-01 DIAGNOSIS — G56.01 RIGHT CARPAL TUNNEL SYNDROME: Primary | ICD-10-CM

## 2023-03-01 DIAGNOSIS — G56.02 LEFT CARPAL TUNNEL SYNDROME: ICD-10-CM

## 2023-03-05 ENCOUNTER — HOSPITAL ENCOUNTER (EMERGENCY)
Age: 63
Discharge: HOME | End: 2023-03-05
Payer: COMMERCIAL

## 2023-03-05 VITALS
OXYGEN SATURATION: 98 % | BODY MASS INDEX: 34.15 KG/M2 | HEIGHT: 64 IN | RESPIRATION RATE: 18 BRPM | SYSTOLIC BLOOD PRESSURE: 145 MMHG | WEIGHT: 200 LBS | HEART RATE: 70 BPM | TEMPERATURE: 98.2 F

## 2023-03-05 DIAGNOSIS — M54.31 SCIATICA OF RIGHT SIDE: Primary | ICD-10-CM

## 2023-03-05 PROCEDURE — 99211 OFF/OP EST MAY X REQ PHY/QHP: CPT

## 2023-03-05 RX ORDER — METHOCARBAMOL 500 MG/1
500 TABLET, FILM COATED ORAL 4 TIMES DAILY PRN
Qty: 30 TABLET | Refills: 0 | Status: SHIPPED | OUTPATIENT
Start: 2023-03-05 | End: 2023-03-15

## 2023-03-05 RX ORDER — ONDANSETRON 4 MG/1
4 TABLET, ORALLY DISINTEGRATING ORAL 3 TIMES DAILY PRN
Qty: 21 TABLET | Refills: 0 | Status: SHIPPED | OUTPATIENT
Start: 2023-03-05

## 2023-03-05 RX ORDER — PREDNISONE 10 MG/1
TABLET ORAL
Qty: 30 TABLET | Refills: 0 | Status: SHIPPED | OUTPATIENT
Start: 2023-03-05 | End: 2023-03-15

## 2023-03-05 RX ORDER — LIDOCAINE 50 MG/G
1 PATCH TOPICAL DAILY
Qty: 10 PATCH | Refills: 0 | Status: SHIPPED | OUTPATIENT
Start: 2023-03-05 | End: 2023-03-15

## 2023-03-05 ASSESSMENT — PAIN DESCRIPTION - FREQUENCY: FREQUENCY: CONTINUOUS

## 2023-03-05 ASSESSMENT — PAIN DESCRIPTION - DESCRIPTORS: DESCRIPTORS: ACHING;SORE

## 2023-03-05 ASSESSMENT — PAIN - FUNCTIONAL ASSESSMENT
PAIN_FUNCTIONAL_ASSESSMENT: PREVENTS OR INTERFERES WITH MANY ACTIVE NOT PASSIVE ACTIVITIES
PAIN_FUNCTIONAL_ASSESSMENT: 0-10

## 2023-03-05 ASSESSMENT — PAIN DESCRIPTION - ORIENTATION: ORIENTATION: RIGHT

## 2023-03-05 ASSESSMENT — PAIN DESCRIPTION - LOCATION: LOCATION: LEG;FOOT

## 2023-03-05 ASSESSMENT — PAIN DESCRIPTION - PAIN TYPE: TYPE: ACUTE PAIN

## 2023-03-05 ASSESSMENT — PAIN SCALES - GENERAL: PAINLEVEL_OUTOF10: 10

## 2023-03-05 ASSESSMENT — PAIN DESCRIPTION - ONSET: ONSET: PROGRESSIVE

## 2023-03-05 NOTE — ED PROVIDER NOTES
Department of Emergency Medicine   ED  Provider Note  Admit Date/RoomTime: 3/5/2023  1:41 PM  ED Room: 02/02      2:28 PM EST      HPI: Kim Gómez 70 y. o.female with   Past Medical History:   Diagnosis Date    COVID-19 10/2021    muscle aches coughing HA    H/O cardiovascular stress test 01/11/2017    Lexiscan    neg per pt    HTN (hypertension)     Hyperlipidemia     Hypothyroid     Organic insomnia     PONV (postoperative nausea and vomiting)     Sarcoidosis     bilat lungs  (remission x 15 yrs)    who presents with right sided leg pain that radiates up and down the posterior aspect of her leg to her back. The patient states that the back pain began 2 days. The history is obtained from the patient. The mechanism of the injury is apparent as she does admit to increased activity with her grandkids that day. She also reports wearing new shoes that she thinks exacerbated her symptoms. The patient states that the pain is moderate. It is worsened by movement including flexion and extension of the back as well as rotation. Patient denies any distal neurovascular complaints including numbness tingling or weakness. There are no bowel or bladder symptoms reported. No incontinence and no urinary retention. The patient denies saddle or groin anesthesia. The patient also denies fevers, chills, weight loss, night sweats, IV drug use, or recent illness. Review of Systems:   Pertinent positives and negatives are stated within HPI, all other systems reviewed and are negative.      --------------------------------------------- PAST HISTORY ---------------------------------------------  Past Medical History:  has a past medical history of COVID-19, H/O cardiovascular stress test, HTN (hypertension), Hyperlipidemia, Hypothyroid, Organic insomnia, PONV (postoperative nausea and vomiting), and Sarcoidosis. Past Surgical History:  has a past surgical history that includes Hysterectomy;  Cholecystectomy; bronchoscopy; Carpal tunnel release (Right, 1/17/2023); and Carpal tunnel release (Left, 1/31/2023). Social History:  reports that she quit smoking about 22 years ago. Her smoking use included cigarettes. She has a 7.50 pack-year smoking history. She has never used smokeless tobacco. She reports current alcohol use. She reports that she does not use drugs. Family History: family history includes Breast Cancer (age of onset: 48) in her mother; Cancer in her father; Kidney Disease in her mother. The patients home medications have been reviewed. Allergies: Doxycycline             Nursing Notes Reviewed by myself  Vitals Signs Reviewed by myself  BP (!) 145/0   Pulse 70   Temp 98.2 °F (36.8 °C) (Infrared)   Resp 18   Ht 5' 4\" (1.626 m)   Wt 200 lb (90.7 kg)   SpO2 98%   BMI 34.33 kg/m²         Physical exam:  Constitutional:  The patient is comfortable, well appearing, non toxic in NAD. Patient is alert and oriented x3. Head: Head is atraumatic and normocephalic. Eyes: There is no discharge from the eyes. Sclerae are normal.  ENT: The oropharynx is normal. The mouth is normal to inspection. Neck: Normal range of motion of the neck is present there is no JVD present no meningeal signs are present. Skin: Skin is warm and dry, Skin exam normal  Neurologic exam: The patient's Garrison Coma Scale is 15. No focal motor deficits. There are no focal sensory deficits. Deep tendon reflexes are 1+ bilaterally in the lower extremities. Babinski is absent. Gait is normal. Symmetric strength and sensation in the lower extremities bilaterally. Back exam: The patient does not have reproducible tenderness to palpation in the paravertebral lumbar region however there is tenderness over the right piriformis musculature on the right. There is no evidence of localized erythema nor is there any focal warmth to the back.  The patient has no evidence of single vertebral tenderness or any single area of interspace tenderness. There are no palpable deformities, lacerations, abrasions, or stepoffs. No midline cervical, thoracic, or lumbar spine tenderness. Medical decision making:        Patient is a 70 y. o.female presenting today for right leg pain. Patient is nontoxic appearing, in no acute distress, and afebrile. NO signs of symptoms of cauda equina syndrome. Imaging studies deferred at this time as there was no injury. Patient agreeable to no imaging at this time. Symptomatology consistent with sciatica. At this time will plan on outpatient symptom management with prednisone taper, robaxin, and lidoderm patches. Advised to follow up with PCP for recheck and return to the ED with new or worsening symptoms. Patient and  voiced understanding and are agreeable to the above treatment plan. Plan:  Review of past medical records for appropriate pain control and outpatient referral.               Impression:  1.  Sciatica of right side          Disposition:  Discharge to home    Condition:  [address], [place]  03/05/23 7969

## 2023-03-15 ENCOUNTER — HOSPITAL ENCOUNTER (OUTPATIENT)
Dept: GENERAL RADIOLOGY | Age: 63
Discharge: HOME | End: 2023-03-17
Payer: COMMERCIAL

## 2023-03-15 ENCOUNTER — HOSPITAL ENCOUNTER (OUTPATIENT)
Age: 63
Discharge: HOME | End: 2023-03-17
Payer: COMMERCIAL

## 2023-03-15 ENCOUNTER — HOSPITAL ENCOUNTER (OUTPATIENT)
Dept: ULTRASOUND IMAGING | Age: 63
Discharge: HOME | End: 2023-03-15
Payer: COMMERCIAL

## 2023-03-15 DIAGNOSIS — M79.671 RIGHT FOOT PAIN: ICD-10-CM

## 2023-03-15 DIAGNOSIS — R06.00 DYSPNEA, UNSPECIFIED TYPE: ICD-10-CM

## 2023-03-15 DIAGNOSIS — I82.4Y3 DEEP VEIN THROMBOSIS (DVT) OF PROXIMAL VEIN OF BOTH LOWER EXTREMITIES, UNSPECIFIED CHRONICITY (HCC): ICD-10-CM

## 2023-03-15 PROCEDURE — 93970 EXTREMITY STUDY: CPT

## 2023-03-15 PROCEDURE — 71046 X-RAY EXAM CHEST 2 VIEWS: CPT

## 2023-04-03 ENCOUNTER — TREATMENT (OUTPATIENT)
Dept: OCCUPATIONAL THERAPY | Age: 63
End: 2023-04-03

## 2023-04-03 ENCOUNTER — OFFICE VISIT (OUTPATIENT)
Dept: ORTHOPEDIC SURGERY | Age: 63
End: 2023-04-03

## 2023-04-03 VITALS — HEIGHT: 64 IN | BODY MASS INDEX: 38.76 KG/M2 | WEIGHT: 227 LBS

## 2023-04-03 DIAGNOSIS — G56.02 LEFT CARPAL TUNNEL SYNDROME: ICD-10-CM

## 2023-04-03 DIAGNOSIS — G56.01 RIGHT CARPAL TUNNEL SYNDROME: Primary | ICD-10-CM

## 2023-04-03 PROBLEM — E66.01 SEVERE OBESITY (BMI 35.0-39.9) WITH COMORBIDITY (HCC): Status: ACTIVE | Noted: 2023-04-03

## 2023-04-03 PROBLEM — N18.30 CHRONIC RENAL DISEASE, STAGE III (HCC): Status: ACTIVE | Noted: 2023-04-03

## 2023-04-03 PROCEDURE — 99024 POSTOP FOLLOW-UP VISIT: CPT | Performed by: ORTHOPAEDIC SURGERY

## 2023-04-03 NOTE — PROGRESS NOTES
Subjective:     Joanne Gómez is here for followup after bilateral carpal tunnel surgery. The patient is having  pain. The patient notes improvement in the following symptoms: strength, numbness, sensation.The patient denies fever, wound drainage, increasing redness, pus, increasing pain, increasing swelling. Post op problems reported: pain and stiffness in both palms     Objective:       General :    alert, appears stated age and cooperative   Sutures:   Sutures in place and will be removed today.   Incision:  healing well, no significant drainage, no dehiscence, no significant erythema   Tenderness:  none   Flexion ROM:  full range of motion   Extension ROM:  full range of motion   Effusion:  none        Assessment:     Encounter Diagnoses   Name Primary?    Right carpal tunnel syndrome Yes    Left carpal tunnel syndrome        Plan:     Range of motion and rehabilitation exercises discussed with the patient.  HEP  Continue OT  Celebrex 200 daily  F/u prn  Call with any questions or concerns at [phone]

## 2023-04-03 NOTE — PROGRESS NOTES
[] ADL/IADL re-training    [x] Therapeutic Activity                  [x] Pain Management with/without modalities PRN                 [x] Manual Therapy                      [] Splinting                                   [x] Scar Management                   []Joint Protection/Training  []Ergonomics                             [] Joint Mobilization                      [] Adaptive Equipment Assessment/Training                             [x] Manual Edema Mobilization   [] Myofascial Release                 [] Energy Conservation/Work Simplification  [] GM/FM Coordination                [] Safety retraining/education per  individual diagnosis/goals  [x] Desensitization        Patient Specific Goal: To go back to work and use my hands well                             GOALS (Long term same as Short term): Updated on 4/3/23    1) Patient will demonstrate good understanding of home program (exercises/activities/diagnosis/prognosis/goals) with good accuracy. Ongoing goal. Pt. Completes HEP 3 times a day. 2) Patient will demonstrate increased active/passive range of motion of their both wrists by at least 10* for improved ADL/IADL completion including toileting and bathing. Good progress towards goal. See measurements below. Pt. Now completes all self care independently except washing her back. 3) Patient will demonstrate increased /pinch strength of at least 10 / 3-5 pinch pounds of their both hands. Progress noted. See measurements below. LUE tests for the first time today. 4) Patient to report decreased pain in their affected both distal upper extremities from 5-7/10 with light use to 3/10 or less with resistive functional use. Progress noted. Pt. Reports 3/10 on average with daily tasks. Pt. Reports pain can increase to 7/10 pain with moderate lifting. 5) Patient will be knowledgeable of edema control techniques as evident with decreases from moderate to mild/none in both hands. Progress noted.  The LUE

## 2023-04-20 ENCOUNTER — TREATMENT (OUTPATIENT)
Dept: OCCUPATIONAL THERAPY | Age: 63
End: 2023-04-20

## 2023-04-20 DIAGNOSIS — G56.01 RIGHT CARPAL TUNNEL SYNDROME: Primary | ICD-10-CM

## 2023-04-20 DIAGNOSIS — G56.02 LEFT CARPAL TUNNEL SYNDROME: ICD-10-CM

## 2023-04-20 NOTE — PROGRESS NOTES
hands. Progress noted. The LUE still has moderate edema at times. The RUE edema has resolved. 6) Patient will demonstrate a non-tender/non-adherent scar. Progress noted. LUE is still hypersensitive. 7) Patient will report ADL/ IADL functions as Mod I/I using the hand effectively including typing/ writing for work and meal prep and ability to open containers. Good progress towards goal. Pt. Completes all self care. Pt. Has been typing and writing at work, but requires breaks. Pcan perform meal prep, but  helps. Pt. Is still unable to open containers. 8) Patient will decrease QuickDASH score to 30% or less for increased participation in daily functional activities. Goal Met. QuickDASH score decreased from 57% to 27% today. TODAY'S TREATMENT     Pain Level: 3 on scale of 1-10, aching LUE hurts more then RUE. Subjective: \"I do feel like my L hand is doing better regarding strength. \"     Objective:    Updated POC to be completed by 4/1/23    INTERVENTION: COMPLETED: SPECIFICS/COMMENTS:   Modality:     Paraffin bath x    US X 3.3 mhz at 50% int- 0.8 end- 5 mins on each UE.    AROM:     B wrists  x -AROM ex's   -[id number]- 3 reps each    B hands X  x -Tendon glides  -exercise spheres- 20 reps with BUE's- CCW and CW   AAROM:               PROM/Stretching:     B wrists X  X  x -PROM ex's   -Prayer stretches- 15 sec holds x5.   -Median nerve glides- 10 reps           Scar Mass/Edema Control:     Scar massage x BUE's        Strengthening:     B hand strengthening x -Beige theraputty ex's- - 20 reps and roll/pinch- 20 reps        Other: HEP     Tendon glides x    Prayer stretches x    Median nerve glides x      Assessment/Comments: Pt. Is making progress towards established goals. Pt. Tolerated all exercises and stretches today. Initiated light hand strengthening today with F+ tolerance. Will continue to advance as tolerated.      -Rehab Potential: Good   -Patient Response to Treatment: Pt.

## 2023-04-26 ENCOUNTER — TREATMENT (OUTPATIENT)
Dept: OCCUPATIONAL THERAPY | Age: 63
End: 2023-04-26

## 2023-04-26 DIAGNOSIS — G56.01 RIGHT CARPAL TUNNEL SYNDROME: Primary | ICD-10-CM

## 2023-04-26 DIAGNOSIS — G56.02 LEFT CARPAL TUNNEL SYNDROME: ICD-10-CM

## 2023-04-26 NOTE — PROGRESS NOTES
to progress as tolerated. -Rehab Potential: Good   -Patient Response to Treatment: Pt. Happy with her results today. Patient. Education:  [] Plans/Goals, Risks/Benefits discussed  [x] Home exercise program  Method of Education: [x] Verbal  [x] Demo  [] Written  Comprehension of Education:  [x] Verbalizes understanding. [x] Demonstrates understanding. [] Needs Review. [] Demonstrates/verbalizes understanding of HEP/Ed previously given. Time In: 1505            Time Out: 1600             CODE  Minutes  Units   14845 Fluidotherapy     69879 Paraffin 10 1   07794 Ultrasound 10 1   82359 Electrical Stim - Attended     17638 Iontophoresis     87447 Therapeutic Ex 20 1   42092 Therapeutic Activity     33972 Neuromuscular Re-Ed     77853 Manual Therapy 15 1   79387 ADL/COMP Tech Train     72160 Orthotic Management/Training      Other                 Total  55 4         Plan: OT 1-2x/week for up to 12 sessions    [x]  Continues Plan of care with focus on AROM, strength and decreasing symptoms of CTS in BUE's : Treatment covered based on POC and graduated to patient's progress. Pt education continues at each visit to obtain maximum benefits from skilled OT intervention.   []  [facility] of care:   []  Discharge:      MELITON Valle/OSCAR [id number]

## 2023-06-20 ENCOUNTER — HOSPITAL ENCOUNTER (OUTPATIENT)
Age: 63
Discharge: HOME | End: 2023-06-20

## 2023-06-20 DIAGNOSIS — R73.9 HYPERGLYCEMIA: ICD-10-CM

## 2023-06-20 DIAGNOSIS — E78.01 FAMILIAL HYPERCHOLESTEROLEMIA: ICD-10-CM

## 2023-06-21 ENCOUNTER — HOSPITAL ENCOUNTER (OUTPATIENT)
Age: 63
Discharge: HOME | End: 2023-06-21
Payer: COMMERCIAL

## 2023-06-21 LAB
ALBUMIN SERPL-MCNC: 4.8 G/DL (ref 3.5–5.2)
ALP SERPL-CCNC: 67 U/L (ref 35–104)
ALT SERPL-CCNC: 30 U/L (ref 0–32)
ANION GAP SERPL CALCULATED.3IONS-SCNC: 13 MMOL/L (ref 7–16)
AST SERPL-CCNC: 28 U/L (ref 0–31)
BILIRUB SERPL-MCNC: 0.6 MG/DL (ref 0–1.2)
BUN SERPL-MCNC: 17 MG/DL (ref 6–23)
CALCIUM SERPL-MCNC: 10.2 MG/DL (ref 8.6–10.2)
CHLORIDE SERPL-SCNC: 98 MMOL/L (ref 98–107)
CHOLESTEROL, TOTAL: 211 MG/DL (ref 0–199)
CO2 SERPL-SCNC: 26 MMOL/L (ref 22–29)
CREAT SERPL-MCNC: 1.1 MG/DL (ref 0.5–1)
GLUCOSE SERPL-MCNC: 80 MG/DL (ref 74–99)
HBA1C MFR BLD: 6.2 % (ref 4–5.6)
HDLC SERPL-MCNC: 63 MG/DL
LDLC SERPL CALC-MCNC: 120 MG/DL (ref 0–99)
POTASSIUM SERPL-SCNC: 3.5 MMOL/L (ref 3.5–5)
PROT SERPL-MCNC: 8.4 G/DL (ref 6.4–8.3)
SODIUM SERPL-SCNC: 137 MMOL/L (ref 132–146)
TRIGL SERPL-MCNC: 139 MG/DL (ref 0–149)
VLDLC SERPL CALC-MCNC: 28 MG/DL

## 2023-06-21 PROCEDURE — 83036 HEMOGLOBIN GLYCOSYLATED A1C: CPT

## 2023-06-21 PROCEDURE — 80061 LIPID PANEL: CPT

## 2023-06-21 PROCEDURE — 80053 COMPREHEN METABOLIC PANEL: CPT

## 2023-06-21 PROCEDURE — 36415 COLL VENOUS BLD VENIPUNCTURE: CPT

## 2023-07-04 ENCOUNTER — HOSPITAL ENCOUNTER (EMERGENCY)
Age: 63
Discharge: HOME | End: 2023-07-04
Attending: EMERGENCY MEDICINE
Payer: COMMERCIAL

## 2023-07-04 VITALS
SYSTOLIC BLOOD PRESSURE: 170 MMHG | HEIGHT: 64 IN | WEIGHT: 217 LBS | BODY MASS INDEX: 37.05 KG/M2 | RESPIRATION RATE: 16 BRPM | DIASTOLIC BLOOD PRESSURE: 98 MMHG | OXYGEN SATURATION: 100 % | TEMPERATURE: 98.7 F | HEART RATE: 84 BPM

## 2023-07-04 DIAGNOSIS — I10 UNCONTROLLED HYPERTENSION: Primary | ICD-10-CM

## 2023-07-04 DIAGNOSIS — N28.9 ACUTE RENAL INSUFFICIENCY: ICD-10-CM

## 2023-07-04 LAB
ANION GAP SERPL CALCULATED.3IONS-SCNC: 11 MMOL/L (ref 7–16)
BACTERIA URNS QL MICRO: ABNORMAL /HPF
BILIRUB UR QL STRIP: NEGATIVE
BUN SERPL-MCNC: 14 MG/DL (ref 6–23)
CALCIUM SERPL-MCNC: 10.7 MG/DL (ref 8.6–10.2)
CHLORIDE SERPL-SCNC: 99 MMOL/L (ref 98–107)
CLARITY UR: CLEAR
CO2 SERPL-SCNC: 29 MMOL/L (ref 22–29)
COLOR UR: ABNORMAL
CREAT SERPL-MCNC: 1.5 MG/DL (ref 0.5–1)
EPI CELLS #/AREA URNS HPF: ABNORMAL /HPF
ERYTHROCYTE [DISTWIDTH] IN BLOOD BY AUTOMATED COUNT: 14.7 FL (ref 11.5–15)
GLUCOSE SERPL-MCNC: 116 MG/DL (ref 74–99)
GLUCOSE UR STRIP-MCNC: NEGATIVE MG/DL
HCT VFR BLD AUTO: 38 % (ref 34–48)
HGB BLD-MCNC: 13 G/DL (ref 11.5–15.5)
HGB UR QL STRIP: NEGATIVE
KETONES UR STRIP-MCNC: NEGATIVE MG/DL
LEUKOCYTE ESTERASE UR QL STRIP: NEGATIVE
MCH RBC QN AUTO: 29 PG (ref 26–35)
MCHC RBC AUTO-ENTMCNC: 34.2 % (ref 32–34.5)
MCV RBC AUTO: 84.8 FL (ref 80–99.9)
NITRITE UR QL STRIP: NEGATIVE
PH UR STRIP: 6 [PH] (ref 5–9)
PLATELET # BLD AUTO: 191 E9/L (ref 130–450)
PMV BLD AUTO: 11.6 FL (ref 7–12)
POTASSIUM SERPL-SCNC: 3.7 MMOL/L (ref 3.5–5)
PROT UR STRIP-MCNC: NEGATIVE MG/DL
RBC # BLD AUTO: 4.48 E12/L (ref 3.5–5.5)
RBC #/AREA URNS HPF: ABNORMAL /HPF (ref 0–2)
SODIUM SERPL-SCNC: 139 MMOL/L (ref 132–146)
SP GR UR STRIP: <=1.005 (ref 1–1.03)
TROPONIN, HIGH SENSITIVITY: 11 NG/L (ref 0–9)
UROBILINOGEN UR STRIP-ACNC: 0.2 E.U./DL
WBC # BLD: 6.1 E9/L (ref 4.5–11.5)
WBC #/AREA URNS HPF: ABNORMAL /HPF (ref 0–5)

## 2023-07-04 PROCEDURE — 80048 BASIC METABOLIC PNL TOTAL CA: CPT

## 2023-07-04 PROCEDURE — 84484 ASSAY OF TROPONIN QUANT: CPT

## 2023-07-04 PROCEDURE — 6370000000 HC RX 637 (ALT 250 FOR IP): Performed by: EMERGENCY MEDICINE

## 2023-07-04 PROCEDURE — 93005 ELECTROCARDIOGRAM TRACING: CPT | Performed by: EMERGENCY MEDICINE

## 2023-07-04 PROCEDURE — 81001 URINALYSIS AUTO W/SCOPE: CPT

## 2023-07-04 PROCEDURE — 99284 EMERGENCY DEPT VISIT MOD MDM: CPT

## 2023-07-04 PROCEDURE — 85027 COMPLETE CBC AUTOMATED: CPT

## 2023-07-04 RX ORDER — AMLODIPINE BESYLATE 5 MG/1
5 TABLET ORAL ONCE
Status: COMPLETED | OUTPATIENT
Start: 2023-07-04 | End: 2023-07-04

## 2023-07-04 RX ADMIN — AMLODIPINE BESYLATE 5 MG: 5 TABLET ORAL at 19:57

## 2023-07-04 ASSESSMENT — ENCOUNTER SYMPTOMS
SHORTNESS OF BREATH: 0
ABDOMINAL PAIN: 0

## 2023-07-04 ASSESSMENT — VISUAL ACUITY
OU: 20/10
OD: 20/30
OS: 20/13

## 2023-07-04 NOTE — ED PROVIDER NOTES
Presents emergency department stating that she has not been feeling well the past few days. She states that she checked her blood pressure and found it to be elevated. Her systolic was 788G. She states that there were recent changes made to her medications including discontinuation of the diuretic secondary to renal function of 1.1. She states that she did take her Benicar with the hydrochlorothiazide added in yesterday but did not have any improvement today. She denies any chest pain or shortness of breath. She states there is a no headache no numbness or weakness. She does state that her vision has not been as good as it typically is. The history is provided by the patient. Review of Systems   Constitutional: Negative. HENT: Negative. Eyes:  Positive for visual disturbance. Respiratory:  Negative for shortness of breath. Cardiovascular:  Negative for chest pain, palpitations and leg swelling. Gastrointestinal:  Negative for abdominal pain. Genitourinary: Negative. Musculoskeletal: Negative. Neurological:  Positive for dizziness (intermittent). Negative for syncope, speech difficulty, weakness, light-headedness, numbness and headaches. Physical Exam  Vitals and nursing note reviewed. Constitutional:       General: She is not in acute distress. Appearance: Normal appearance. She is well-developed and normal weight. She is not ill-appearing, toxic-appearing or diaphoretic. HENT:      Head: Normocephalic and atraumatic. Nose: Nose normal.      Mouth/Throat:      Mouth: Mucous membranes are moist.      Pharynx: Oropharynx is clear. Eyes:      Extraocular Movements: Extraocular movements intact. Conjunctiva/sclera: Conjunctivae normal.      Pupils: Pupils are equal, round, and reactive to light. Cardiovascular:      Rate and Rhythm: Normal rate and regular rhythm. Pulses: Normal pulses. Heart sounds: Normal heart sounds.  No murmur

## 2023-07-05 ENCOUNTER — CARE COORDINATION (OUTPATIENT)
Dept: OTHER | Facility: CLINIC | Age: 63
End: 2023-07-05

## 2023-07-05 LAB
EKG ATRIAL RATE: 77 BPM
EKG P AXIS: 26 DEGREES
EKG P-R INTERVAL: 160 MS
EKG Q-T INTERVAL: 384 MS
EKG QRS DURATION: 88 MS
EKG QTC CALCULATION (BAZETT): 434 MS
EKG R AXIS: -25 DEGREES
EKG T AXIS: 46 DEGREES
EKG VENTRICULAR RATE: 77 BPM

## 2023-07-05 PROCEDURE — 93010 ELECTROCARDIOGRAM REPORT: CPT | Performed by: INTERNAL MEDICINE

## 2023-07-12 ENCOUNTER — CARE COORDINATION (OUTPATIENT)
Dept: OTHER | Facility: CLINIC | Age: 63
End: 2023-07-12

## 2023-07-14 ENCOUNTER — TRANSCRIBE ORDERS (OUTPATIENT)
Dept: ADMINISTRATIVE | Age: 63
End: 2023-07-14

## 2023-07-14 DIAGNOSIS — N18.32 CHRONIC KIDNEY DISEASE (CKD) STAGE G3B/A1, MODERATELY DECREASED GLOMERULAR FILTRATION RATE (GFR) BETWEEN 30-44 ML/MIN/1.73 SQUARE METER AND ALBUMINURIA CREATININE RATIO LESS THAN 30 MG/G (HCC): Primary | ICD-10-CM

## 2023-07-25 ENCOUNTER — HOSPITAL ENCOUNTER (OUTPATIENT)
Dept: ULTRASOUND IMAGING | Age: 63
Discharge: HOME | End: 2023-07-25
Payer: COMMERCIAL

## 2023-07-25 ENCOUNTER — CARE COORDINATION (OUTPATIENT)
Dept: OTHER | Facility: CLINIC | Age: 63
End: 2023-07-25

## 2023-07-25 DIAGNOSIS — N18.32 CHRONIC KIDNEY DISEASE (CKD) STAGE G3B/A1, MODERATELY DECREASED GLOMERULAR FILTRATION RATE (GFR) BETWEEN 30-44 ML/MIN/1.73 SQUARE METER AND ALBUMINURIA CREATININE RATIO LESS THAN 30 MG/G (HCC): ICD-10-CM

## 2023-07-25 PROCEDURE — 76770 US EXAM ABDO BACK WALL COMP: CPT

## 2023-07-25 NOTE — CARE COORDINATION
Ambulatory Care Coordination Note    ACM attempted to reach patient for care management follow up call regarding blood pressure and appt with nephrology. HIPAA compliant message left requesting a return phone call at patient convenience.      Plan for follow-up call in 10-14 days    Future Appointments   Date Time Provider [address]   9/6/2023  1:45 PM DO JOHN Huff [address] [facility]

## 2023-07-28 ENCOUNTER — HOSPITAL ENCOUNTER (OUTPATIENT)
Age: 63
Discharge: HOME | End: 2023-07-28
Payer: COMMERCIAL

## 2023-07-28 LAB
25(OH)D3 SERPL-MCNC: 46.8 NG/ML (ref 30–100)
ALBUMIN SERPL-MCNC: 5.3 G/DL (ref 3.5–5.2)
ALP SERPL-CCNC: 69 U/L (ref 35–104)
ALT SERPL-CCNC: 33 U/L (ref 0–32)
ANION GAP SERPL CALCULATED.3IONS-SCNC: 13 MMOL/L (ref 7–16)
AST SERPL-CCNC: 29 U/L (ref 0–31)
BILIRUB SERPL-MCNC: 0.7 MG/DL (ref 0–1.2)
BUN SERPL-MCNC: 11 MG/DL (ref 6–23)
CALCIUM SERPL-MCNC: 10.6 MG/DL (ref 8.6–10.2)
CHLORIDE SERPL-SCNC: 98 MMOL/L (ref 98–107)
CO2 SERPL-SCNC: 28 MMOL/L (ref 22–29)
CREAT SERPL-MCNC: 1.2 MG/DL (ref 0.5–1)
CREAT UR-MCNC: 71.2 MG/DL (ref 29–226)
GFR SERPL CREATININE-BSD FRML MDRD: 48 ML/MIN/1.73M2
GLUCOSE SERPL-MCNC: 105 MG/DL (ref 74–107)
MAGNESIUM SERPL-MCNC: 2 MG/DL (ref 1.6–2.6)
MICROALBUMIN UR-MCNC: <12 MG/L (ref 0–19)
PHOSPHATE SERPL-MCNC: 3.3 MG/DL (ref 2.5–4.5)
POTASSIUM SERPL-SCNC: 3.4 MMOL/L (ref 3.5–5)
PROT SERPL-MCNC: 8.4 G/DL (ref 6.4–8.3)
PTH-INTACT SERPL-MCNC: 49.4 PG/ML (ref 15–65)
SODIUM SERPL-SCNC: 139 MMOL/L (ref 132–146)

## 2023-07-28 PROCEDURE — 82043 UR ALBUMIN QUANTITATIVE: CPT

## 2023-07-28 PROCEDURE — 84100 ASSAY OF PHOSPHORUS: CPT

## 2023-07-28 PROCEDURE — 82570 ASSAY OF URINE CREATININE: CPT

## 2023-07-28 PROCEDURE — 83735 ASSAY OF MAGNESIUM: CPT

## 2023-07-28 PROCEDURE — 83970 ASSAY OF PARATHORMONE: CPT

## 2023-07-28 PROCEDURE — 36415 COLL VENOUS BLD VENIPUNCTURE: CPT

## 2023-07-28 PROCEDURE — 80053 COMPREHEN METABOLIC PANEL: CPT

## 2023-07-28 PROCEDURE — 82306 VITAMIN D 25 HYDROXY: CPT

## 2023-08-04 ENCOUNTER — CARE COORDINATION (OUTPATIENT)
Dept: OTHER | Facility: CLINIC | Age: 63
End: 2023-08-04

## 2023-08-04 NOTE — CARE COORDINATION
Ambulatory Care Coordination Note    ACM attempted to reach patient for care management follow up call regarding blood pressure, medications and appts. HIPAA compliant message left requesting a return phone call at patient convenience. Plan for follow up in 14-21 days.     Future Appointments   Date Time Provider 4600  [address]   9/6/2023  1:45 PM DO JOHN Carmichael [facility] JOHN [facility]

## 2023-08-18 ENCOUNTER — CARE COORDINATION (OUTPATIENT)
Dept: OTHER | Facility: CLINIC | Age: 63
End: 2023-08-18

## 2023-08-18 RX ORDER — OLMESARTAN MEDOXOMIL AND HYDROCHLOROTHIAZIDE 40/12.5 40; 12.5 MG/1; MG/1
1 TABLET ORAL DAILY
COMMUNITY

## 2023-08-18 NOTE — CARE COORDINATION
Ambulatory Care Coordination Note  2023    Patient Current Location: [place]     ACM contacted the patient by telephone. Verified name and  with patient as identifiers. Challenges to be reviewed by the provider   Additional needs identified to be addressed with provider: No  none               Method of communication with provider: none. ACM: Roxanne Umaña RN    ACM contacted the patient to follow up on progress, discuss new issues or concerns, and reinforce/provide patient education. Summary Note: Patient reports completed follow up with Dr Francesco Oconnell; next appt scheduled for 3 months. Patient reports appt with Dr. Georges Enciso in September. Patient reports Dr. Francesco Oconnell decreased Norvasc to 5 mg po daily and patient is taking Benicar/hydrochlorothiazide 40-12.5 po daily. Patient reports improvement in kidney function. Patient is monitoring blood pressure bid; did not check this morning. Patient reports \"normal most of the time,\" 127/80. Patient reports \"little low sometimes,\" 110/70. Patient is hydrating with  oz of water daily. Urine is clear/pale yellow. Discussed risks of overhydration. Reviewed signs and symptoms of dehydration including but not limited to tenting of the skin, dark urine, decreased urine output, and dry mucous membranes. Patient denies any adverse symptoms at this time. Patient reports walking at work/exercising. Patient reports has lost 21 lbs; is also eating healthy. Patient states freezing and judy produce from garden as well as shopping at produce stand. Patient denies any edema at this time. Instructed to discuss any OTC decongestants with Pharmacist before taking due to hypertension. Patient verbalized understanding. ACM discussed RED FLAGS and encouraged patient to contact 911 for life threatening emergencies and PCP office  for non life-threatening symptoms.    Discussed when to contact physician and when to seek emergency medical attention

## 2023-09-07 ENCOUNTER — HOSPITAL ENCOUNTER (OUTPATIENT)
Age: 63
Discharge: HOME | End: 2023-09-07
Payer: COMMERCIAL

## 2023-09-07 LAB — URATE SERPL-MCNC: 8.4 MG/DL (ref 2.4–5.7)

## 2023-09-07 PROCEDURE — 84550 ASSAY OF BLOOD/URIC ACID: CPT

## 2023-09-07 PROCEDURE — 36415 COLL VENOUS BLD VENIPUNCTURE: CPT

## 2023-09-20 ENCOUNTER — CARE COORDINATION (OUTPATIENT)
Dept: OTHER | Facility: CLINIC | Age: 63
End: 2023-09-20

## 2023-09-20 SDOH — ECONOMIC STABILITY: HOUSING INSECURITY: IN THE LAST 12 MONTHS, HOW MANY PLACES HAVE YOU LIVED?: 1

## 2023-09-20 SDOH — ECONOMIC STABILITY: TRANSPORTATION INSECURITY
IN THE PAST 12 MONTHS, HAS LACK OF TRANSPORTATION KEPT YOU FROM MEETINGS, WORK, OR FROM GETTING THINGS NEEDED FOR DAILY LIVING?: NO

## 2023-09-20 SDOH — ECONOMIC STABILITY: TRANSPORTATION INSECURITY
IN THE PAST 12 MONTHS, HAS THE LACK OF TRANSPORTATION KEPT YOU FROM MEDICAL APPOINTMENTS OR FROM GETTING MEDICATIONS?: NO

## 2023-09-20 SDOH — ECONOMIC STABILITY: INCOME INSECURITY: IN THE LAST 12 MONTHS, WAS THERE A TIME WHEN YOU WERE NOT ABLE TO PAY THE MORTGAGE OR RENT ON TIME?: NO

## 2023-09-20 SDOH — ECONOMIC STABILITY: HOUSING INSECURITY
IN THE LAST 12 MONTHS, WAS THERE A TIME WHEN YOU DID NOT HAVE A STEADY PLACE TO SLEEP OR SLEPT IN A SHELTER (INCLUDING NOW)?: NO

## 2023-09-20 ASSESSMENT — SOCIAL DETERMINANTS OF HEALTH (SDOH)
WITHIN THE LAST YEAR, HAVE YOU BEEN AFRAID OF YOUR PARTNER OR EX-PARTNER?: NO
HOW OFTEN DO YOU ATTENT MEETINGS OF THE CLUB OR ORGANIZATION YOU BELONG TO?: 1 TO 4 TIMES PER YEAR
WITHIN THE LAST YEAR, HAVE YOU BEEN KICKED, HIT, SLAPPED, OR OTHERWISE PHYSICALLY HURT BY YOUR PARTNER OR EX-PARTNER?: NO
WITHIN THE LAST YEAR, HAVE TO BEEN RAPED OR FORCED TO HAVE ANY KIND OF SEXUAL ACTIVITY BY YOUR PARTNER OR EX-PARTNER?: NO
DO YOU BELONG TO ANY CLUBS OR ORGANIZATIONS SUCH AS CHURCH GROUPS UNIONS, FRATERNAL OR ATHLETIC GROUPS, OR SCHOOL GROUPS?: YES
HOW OFTEN DO YOU GET TOGETHER WITH FRIENDS OR RELATIVES?: ONCE A WEEK
IN A TYPICAL WEEK, HOW MANY TIMES DO YOU TALK ON THE PHONE WITH FAMILY, FRIENDS, OR NEIGHBORS?: MORE THAN THREE TIMES A WEEK
HOW OFTEN DO YOU ATTEND CHURCH OR RELIGIOUS SERVICES?: MORE THAN 4 TIMES PER YEAR
WITHIN THE LAST YEAR, HAVE YOU BEEN HUMILIATED OR EMOTIONALLY ABUSED IN OTHER WAYS BY YOUR PARTNER OR EX-PARTNER?: NO

## 2023-09-20 NOTE — CARE COORDINATION
Ambulatory Care Coordination Note  2023    Patient Current Location: [place]     ACM contacted the patient by telephone. Verified name and  with patient as identifiers. Challenges to be reviewed by the provider   Additional needs identified to be addressed with provider: No  none               Method of communication with provider: none. ACM: Christie Park RN    ACM contacted the patient to follow up on progress, discuss new issues or concerns, and reinforce/provide patient education. Summary Note: Patient reports weight loss of 30 lbs since July. Patient congratulated. Patient states continues to eat healthy. Discussed appropriate calore intake. Patient reports hydrating with 80 oz of water daily. Patient denies edema. Patient reports swelling to right foot due to recent diagnosis of gout. Patient did not take Prednisone or antibiotic. Patient has Prednisone in home if needed. Patient was taking Colchicine; discontinued two days ago due to possible effects on kidneys. This ACM outreached Pharmacist who reports should not effect kidneys and should take til symptoms are gone. Patient is hydrating appropriately with med. Patient verbalized understanding. Patient continues to c/o \"tinge\" of pain. Patient also outreached nephrologist regarding med; has not received return call. Patient to outreach again due to needing appt also. Patient reports stress with positive coping mechanisms. ACM discussed RED FLAGS and encouraged patient to contact 911 for life threatening emergencies and PCP office  for non life-threatening symptoms. Discussed when to contact physician and when to seek emergency medical attention with patient. Instructed patient always best to seek evaluation by PCP before emergency care is needed. Patient verbalized understanding. Patient denies any further needs, questions, or concerns at this time other than those being addressed.    Patient is

## 2023-10-19 ENCOUNTER — CARE COORDINATION (OUTPATIENT)
Dept: OTHER | Facility: CLINIC | Age: 63
End: 2023-10-19

## 2023-10-19 NOTE — CARE COORDINATION
call 911. Importance and benefits of: Follow up with PCP and specialist, medication adherence, self monitoring and reporting of symptoms. Plan:  Plan for follow up call in one month based on severity of symptoms and risk factors. Plan for next call: symptom management-complaints  self management-weights  follow-up appointment-Any appts completed or scheduled  medication management-Med changes, questions, compliance    Patient  verbalized understanding and is agreeable to follow up call. Education Documentation  General medication information, taught by Gamal Wiggins RN at 10/19/2023 11:56 AM.  Learner: Patient  Readiness: Art Fulling  Method: Explanation  Response: Lisa Solis    Educate reporting changes in condition, taught by Gamal Wiggins RN at 10/19/2023 11:56 AM.  Learner: Patient  Readiness: Art Fulling  Method: Explanation  Response: Lisa Solis    Educate Patient on When to Call for Symptoms, taught by Gamal Wiggins RN at 10/19/2023 11:56 AM.  Learner: Patient  Readiness: Eager  Method: Explanation  Response: Verbalizes Understanding    Education Comments  No comments found.       Future Appointments   Date Time Provider [address]   11/2/2023  3:00 PM ESHA HERNANDEZ RM 2 [facility]   11/2/2023  3:15 PM ESHA BALBUENA [facility]   11/29/2023  2:15 PM Brett Shipman DO  [facility]

## 2023-10-25 ENCOUNTER — HOSPITAL ENCOUNTER (OUTPATIENT)
Age: 63
Discharge: HOME | End: 2023-10-25
Payer: COMMERCIAL

## 2023-10-25 LAB
25(OH)D3 SERPL-MCNC: 39.7 NG/ML (ref 30–100)
ALBUMIN SERPL-MCNC: 4.7 G/DL (ref 3.5–5.2)
ALP SERPL-CCNC: 76 U/L (ref 35–104)
ALT SERPL-CCNC: 25 U/L (ref 0–32)
ANION GAP SERPL CALCULATED.3IONS-SCNC: 10 MMOL/L (ref 7–16)
AST SERPL-CCNC: 26 U/L (ref 0–31)
BILIRUB SERPL-MCNC: 0.6 MG/DL (ref 0–1.2)
BUN SERPL-MCNC: 13 MG/DL (ref 6–23)
CALCIUM SERPL-MCNC: 10.1 MG/DL (ref 8.6–10.2)
CHLORIDE SERPL-SCNC: 99 MMOL/L (ref 98–107)
CO2 SERPL-SCNC: 30 MMOL/L (ref 22–29)
CREAT SERPL-MCNC: 1 MG/DL (ref 0.5–1)
GFR SERPL CREATININE-BSD FRML MDRD: >60 ML/MIN/1.73M2
GLUCOSE SERPL-MCNC: 96 MG/DL (ref 74–99)
MAGNESIUM SERPL-MCNC: 1.7 MG/DL (ref 1.6–2.6)
MICROALBUMIN UR-MCNC: <12 MG/L (ref 0–19)
PHOSPHATE SERPL-MCNC: 3.3 MG/DL (ref 2.5–4.5)
POTASSIUM SERPL-SCNC: 3.4 MMOL/L (ref 3.5–5)
PROT SERPL-MCNC: 7.9 G/DL (ref 6.4–8.3)
PTH-INTACT SERPL-MCNC: 77.6 PG/ML (ref 15–65)
SODIUM SERPL-SCNC: 139 MMOL/L (ref 132–146)

## 2023-10-25 PROCEDURE — 83970 ASSAY OF PARATHORMONE: CPT

## 2023-10-25 PROCEDURE — 36415 COLL VENOUS BLD VENIPUNCTURE: CPT

## 2023-10-25 PROCEDURE — 82306 VITAMIN D 25 HYDROXY: CPT

## 2023-10-25 PROCEDURE — 84100 ASSAY OF PHOSPHORUS: CPT

## 2023-10-25 PROCEDURE — 83735 ASSAY OF MAGNESIUM: CPT

## 2023-10-25 PROCEDURE — 80053 COMPREHEN METABOLIC PANEL: CPT

## 2023-10-25 PROCEDURE — 82043 UR ALBUMIN QUANTITATIVE: CPT

## 2023-11-20 ENCOUNTER — CARE COORDINATION (OUTPATIENT)
Dept: OTHER | Facility: CLINIC | Age: 63
End: 2023-11-20

## 2023-11-20 SDOH — HEALTH STABILITY: PHYSICAL HEALTH: ON AVERAGE, HOW MANY MINUTES DO YOU ENGAGE IN EXERCISE AT THIS LEVEL?: 20 MIN

## 2023-11-20 SDOH — HEALTH STABILITY: PHYSICAL HEALTH: ON AVERAGE, HOW MANY DAYS PER WEEK DO YOU ENGAGE IN MODERATE TO STRENUOUS EXERCISE (LIKE A BRISK WALK)?: 7 DAYS

## 2023-11-20 NOTE — CARE COORDINATION
Ambulatory Care Coordination Note  2023    Patient Current Location:  Home: [address] [place] [address] [place] [address]     ACM contacted the patient by telephone. Verified name and  with patient as identifiers. Challenges to be reviewed by the provider   Additional needs identified to be addressed with provider: No  none               Method of communication with provider: none. ACM: Germain Marx, RN    ACM contacted the patient to follow up on progress, discuss new issues or concerns, and reinforce/provide patient education. Summary Note: Patient reports doing good. Patient states highest blood pressure 127/75. Patient reports has gout again; c/o pain and redness. Patient reports pain /10. Patient states unable to put shoe on. Patient reports currently not taking anything for pain. Patient has Colchicine in home and began taking. Patient reports now on second day of Colchicine. Patient agreeable to receiving handout regarding foods to avoid or eat in moderation that cause gout. Patient reports has appt with Dr. James Pryor on 23 and Dr. Rosemarie Sharma on 23. Patient had labs obtained prior to appt with nephrology. Kidney function improved. Patient denies issues eating and hydrating. Patient denies weight change since last conversation. Patient reports exercising daily. Patient denies dizziness. Discussed utilizing walk in clinic or urgent care for gout flare up if needed. Discussed utilizing [employer] larisa while out of town for Thanksgiving if medical attention is needed. ACM discussed RED FLAGS and encouraged patient to contact 911 for life threatening emergencies and PCP office  for non life-threatening symptoms. Discussed when to contact physician and when to seek emergency medical attention with patient. Instructed patient always best to seek evaluation by PCP before emergency care is needed. Patient verbalized understanding.      Patient denies any further needs,

## 2024-01-18 ENCOUNTER — CARE COORDINATION (OUTPATIENT)
Dept: OTHER | Facility: CLINIC | Age: 64
End: 2024-01-18

## 2024-01-18 NOTE — CARE COORDINATION
Ambulatory Care Coordination Note    ACM attempted to reach patient for care management follow up call regarding blood pressure, med changes, appts, care coordination needs. HIPAA compliant message left requesting a return phone call at patient convenience.     Plan for follow up call in one month.     Future Appointments   Date Time Provider Department Center   1/23/2024  1:30 PM [facility] DEXA [facility] MAMMO [facility]   1/23/2024  2:00 PM [facility] MAMMO HOLOGIC [facility] MAMMO [facility]   1/23/2024  2:30 PM [facility] US RM 1 [facility] US [facility]   2/21/2024  1:30 PM Alexandr Berkowitz, DO AFL [facility]

## 2024-01-23 ENCOUNTER — HOSPITAL ENCOUNTER (OUTPATIENT)
Dept: ULTRASOUND IMAGING | Age: 64
Discharge: HOME | End: 2024-01-23
Attending: INTERNAL MEDICINE
Payer: COMMERCIAL

## 2024-01-23 ENCOUNTER — HOSPITAL ENCOUNTER (OUTPATIENT)
Dept: MAMMOGRAPHY | Age: 64
Discharge: HOME | End: 2024-01-25
Attending: INTERNAL MEDICINE
Payer: COMMERCIAL

## 2024-01-23 DIAGNOSIS — M81.0 SENILE OSTEOPOROSIS: ICD-10-CM

## 2024-01-23 DIAGNOSIS — N63.0 MASS OF BREAST, UNSPECIFIED LATERALITY: ICD-10-CM

## 2024-01-23 DIAGNOSIS — N64.4 PAIN OF BOTH BREASTS: ICD-10-CM

## 2024-01-23 DIAGNOSIS — N64.4 BREAST PAIN: ICD-10-CM

## 2024-01-23 PROCEDURE — 77080 DXA BONE DENSITY AXIAL: CPT

## 2024-01-23 PROCEDURE — G0279 TOMOSYNTHESIS, MAMMO: HCPCS

## 2024-01-23 PROCEDURE — 76641 ULTRASOUND BREAST COMPLETE: CPT

## 2024-01-31 ENCOUNTER — HOSPITAL ENCOUNTER (OUTPATIENT)
Age: 64
Discharge: HOME | End: 2024-01-31
Payer: MEDICARE

## 2024-01-31 LAB
ALBUMIN SERPL-MCNC: 4.3 G/DL (ref 3.5–5.2)
ALP SERPL-CCNC: 82 U/L (ref 35–104)
ALT SERPL-CCNC: 15 U/L (ref 0–32)
ANION GAP SERPL CALCULATED.3IONS-SCNC: 11 MMOL/L (ref 7–16)
AST SERPL-CCNC: 18 U/L (ref 0–31)
BASOPHILS # BLD: 0 K/UL (ref 0–0.2)
BASOPHILS NFR BLD: 0 % (ref 0–2)
BILIRUB SERPL-MCNC: 0.4 MG/DL (ref 0–1.2)
BUN SERPL-MCNC: 14 MG/DL (ref 6–23)
CALCIUM SERPL-MCNC: 10 MG/DL (ref 8.6–10.2)
CHLORIDE SERPL-SCNC: 100 MMOL/L (ref 98–107)
CO2 SERPL-SCNC: 26 MMOL/L (ref 22–29)
CREAT SERPL-MCNC: 1.2 MG/DL (ref 0.5–1)
EOSINOPHIL # BLD: 0 K/UL (ref 0.05–0.5)
EOSINOPHILS RELATIVE PERCENT: 0 % (ref 0–6)
ERYTHROCYTE [DISTWIDTH] IN BLOOD BY AUTOMATED COUNT: 15.3 % (ref 11.5–15)
GFR SERPL CREATININE-BSD FRML MDRD: 51 ML/MIN/1.73M2
GLUCOSE SERPL-MCNC: 125 MG/DL (ref 74–99)
HCT VFR BLD AUTO: 35.1 % (ref 34–48)
HGB BLD-MCNC: 12.1 G/DL (ref 11.5–15.5)
LYMPHOCYTES NFR BLD: 3.78 K/UL (ref 1.5–4)
LYMPHOCYTES RELATIVE PERCENT: 61 % (ref 20–42)
MCH RBC QN AUTO: 29 PG (ref 26–35)
MCHC RBC AUTO-ENTMCNC: 34.5 G/DL (ref 32–34.5)
MCV RBC AUTO: 84.2 FL (ref 80–99.9)
MONOCYTES NFR BLD: 0.56 K/UL (ref 0.1–0.95)
MONOCYTES NFR BLD: 9 % (ref 2–12)
NEUTROPHILS NFR BLD: 30 % (ref 43–80)
NEUTS SEG NFR BLD: 1.86 K/UL (ref 1.8–7.3)
PHOSPHATE SERPL-MCNC: 3.5 MG/DL (ref 2.5–4.5)
PLATELET # BLD AUTO: 205 K/UL (ref 130–450)
PMV BLD AUTO: 11.2 FL (ref 7–12)
POTASSIUM SERPL-SCNC: 3.6 MMOL/L (ref 3.5–5)
PROT SERPL-MCNC: 7.6 G/DL (ref 6.4–8.3)
RBC # BLD AUTO: 4.17 M/UL (ref 3.5–5.5)
RBC # BLD: ABNORMAL 10*6/UL
SODIUM SERPL-SCNC: 137 MMOL/L (ref 132–146)
URATE SERPL-MCNC: 5.9 MG/DL (ref 2.4–5.7)
WBC OTHER # BLD: 6.2 K/UL (ref 4.5–11.5)

## 2024-01-31 PROCEDURE — 36415 COLL VENOUS BLD VENIPUNCTURE: CPT

## 2024-01-31 PROCEDURE — 84550 ASSAY OF BLOOD/URIC ACID: CPT

## 2024-01-31 PROCEDURE — 85025 COMPLETE CBC W/AUTO DIFF WBC: CPT

## 2024-01-31 PROCEDURE — 84100 ASSAY OF PHOSPHORUS: CPT

## 2024-01-31 PROCEDURE — 80053 COMPREHEN METABOLIC PANEL: CPT

## 2024-02-14 ENCOUNTER — CARE COORDINATION (OUTPATIENT)
Dept: OTHER | Facility: CLINIC | Age: 64
End: 2024-02-14

## 2024-02-14 NOTE — CARE COORDINATION
Ambulatory Care Coordination Note  2024    Patient Current Location:  Home: [address] [place] [id number]     ACM contacted the patient by telephone. Verified name and  with patient as identifiers.     Challenges to be reviewed by the provider   Additional needs identified to be addressed with provider: No  none               Method of communication with provider: none.    ACM: Karie Washington RN    ACM contacted the patient to follow up on progress, discuss new issues or concerns, and reinforce/provide patient education.     Summary Note: Patient reports doing pretty good.  Patient reports a little set back, got off diet and kidney function decreases.  Patient reports was eating \"like used to before\" and eating out a lot.  Patient states is back on track and is losing weight again.  Patient reports gained 6 lbs when not eating healthy.  Patient reports nephrology will monitor lab work every 4 months; last obtained January.  Patient reports blood pressure 117/73 on 24.  Patient reports eating at home and following low sodium diet.  Patient states once again making healthy food choices.  Patient denies any issues with water intake.  Patient reports had stopped exercising but is \"back on track.\"  Patient reports intermittent symptoms of gout; now taking Allopurinol 100 mg po daily.  Per patient, nephrology okayed patient taking Allopurinol 200 mg po daily.  Patient states does take increased dose during flare up.  Patient to discuss with Dr. Berkowitz at [facility] on 24.  Patient to follow up with Dr. Taylor yearly.       ACM discussed RED FLAGS and encouraged patient to contact 911 for life threatening emergencies and PCP office  for non life-threatening symptoms.   Discussed when to contact physician and when to seek emergency medical attention with patient.  Instructed patient always best to seek evaluation by PCP before emergency care is needed.  Patient verbalized understanding.  Patient

## 2024-03-28 ENCOUNTER — HOSPITAL ENCOUNTER (OUTPATIENT)
Age: 64
Discharge: HOME | End: 2024-03-28
Payer: MEDICARE

## 2024-03-28 DIAGNOSIS — R73.9 HYPERGLYCEMIA: ICD-10-CM

## 2024-03-28 DIAGNOSIS — E78.01 FAMILIAL HYPERCHOLESTEROLEMIA: ICD-10-CM

## 2024-03-28 LAB
ALBUMIN SERPL-MCNC: 4.8 G/DL (ref 3.5–5.2)
ALP SERPL-CCNC: 86 U/L (ref 35–104)
ALT SERPL-CCNC: 15 U/L (ref 0–32)
ANION GAP SERPL CALCULATED.3IONS-SCNC: 14 MMOL/L (ref 7–16)
AST SERPL-CCNC: 20 U/L (ref 0–31)
BILIRUB SERPL-MCNC: 0.7 MG/DL (ref 0–1.2)
BUN SERPL-MCNC: 15 MG/DL (ref 6–23)
CALCIUM SERPL-MCNC: 10.2 MG/DL (ref 8.6–10.2)
CHLORIDE SERPL-SCNC: 94 MMOL/L (ref 98–107)
CHOLEST SERPL-MCNC: 206 MG/DL
CO2 SERPL-SCNC: 28 MMOL/L (ref 22–29)
CREAT SERPL-MCNC: 1.2 MG/DL (ref 0.5–1)
GFR SERPL CREATININE-BSD FRML MDRD: 53 ML/MIN/1.73M2
GLUCOSE SERPL-MCNC: 102 MG/DL (ref 74–99)
HBA1C MFR BLD: 6 % (ref 4–5.6)
HDLC SERPL-MCNC: 67 MG/DL
LDLC SERPL CALC-MCNC: 116 MG/DL
POTASSIUM SERPL-SCNC: 3.6 MMOL/L (ref 3.5–5)
PROT SERPL-MCNC: 8.6 G/DL (ref 6.4–8.3)
SODIUM SERPL-SCNC: 136 MMOL/L (ref 132–146)
TRIGL SERPL-MCNC: 115 MG/DL
VLDLC SERPL CALC-MCNC: 23 MG/DL

## 2024-03-28 PROCEDURE — 80061 LIPID PANEL: CPT

## 2024-03-28 PROCEDURE — 83036 HEMOGLOBIN GLYCOSYLATED A1C: CPT

## 2024-03-28 PROCEDURE — 36415 COLL VENOUS BLD VENIPUNCTURE: CPT

## 2024-03-28 PROCEDURE — 80053 COMPREHEN METABOLIC PANEL: CPT

## 2024-05-16 ENCOUNTER — HOSPITAL ENCOUNTER (OUTPATIENT)
Age: 64
Discharge: HOME | End: 2024-05-16
Payer: MEDICARE

## 2024-05-16 LAB
ALBUMIN SERPL-MCNC: 4.6 G/DL (ref 3.5–5.2)
ALP SERPL-CCNC: 92 U/L (ref 35–104)
ALT SERPL-CCNC: 14 U/L (ref 0–32)
ANION GAP SERPL CALCULATED.3IONS-SCNC: 17 MMOL/L (ref 7–16)
AST SERPL-CCNC: 23 U/L (ref 0–31)
BASOPHILS # BLD: 0 K/UL (ref 0–0.2)
BASOPHILS NFR BLD: 0 % (ref 0–2)
BILIRUB SERPL-MCNC: 0.5 MG/DL (ref 0–1.2)
BUN SERPL-MCNC: 17 MG/DL (ref 6–23)
CALCIUM SERPL-MCNC: 10.4 MG/DL (ref 8.6–10.2)
CHLORIDE SERPL-SCNC: 98 MMOL/L (ref 98–107)
CO2 SERPL-SCNC: 21 MMOL/L (ref 22–29)
CREAT SERPL-MCNC: 1 MG/DL (ref 0.5–1)
EOSINOPHIL # BLD: 0 K/UL (ref 0.05–0.5)
EOSINOPHILS RELATIVE PERCENT: 0 % (ref 0–6)
ERYTHROCYTE [DISTWIDTH] IN BLOOD BY AUTOMATED COUNT: 15 % (ref 11.5–15)
GFR, ESTIMATED: 59 ML/MIN/1.73M2
GLUCOSE SERPL-MCNC: 97 MG/DL (ref 74–99)
HCT VFR BLD AUTO: 38.9 % (ref 34–48)
HGB BLD-MCNC: 13.3 G/DL (ref 11.5–15.5)
IMM GRANULOCYTES # BLD AUTO: 0.03 K/UL (ref 0–0.58)
IMM GRANULOCYTES NFR BLD: 0 % (ref 0–5)
LYMPHOCYTES NFR BLD: 3.68 K/UL (ref 1.5–4)
LYMPHOCYTES RELATIVE PERCENT: 52 % (ref 20–42)
MCH RBC QN AUTO: 28.6 PG (ref 26–35)
MCHC RBC AUTO-ENTMCNC: 34.2 G/DL (ref 32–34.5)
MCV RBC AUTO: 83.7 FL (ref 80–99.9)
MONOCYTES NFR BLD: 0.52 K/UL (ref 0.1–0.95)
MONOCYTES NFR BLD: 7 % (ref 2–12)
NEUTROPHILS NFR BLD: 40 % (ref 43–80)
NEUTS SEG NFR BLD: 2.83 K/UL (ref 1.8–7.3)
PHOSPHATE SERPL-MCNC: 3.8 MG/DL (ref 2.5–4.5)
PLATELET # BLD AUTO: 166 K/UL (ref 130–450)
PMV BLD AUTO: 11.2 FL (ref 7–12)
POTASSIUM SERPL-SCNC: 4.2 MMOL/L (ref 3.5–5)
PROT SERPL-MCNC: 8.5 G/DL (ref 6.4–8.3)
RBC # BLD AUTO: 4.65 M/UL (ref 3.5–5.5)
SODIUM SERPL-SCNC: 136 MMOL/L (ref 132–146)
WBC OTHER # BLD: 7.1 K/UL (ref 4.5–11.5)

## 2024-05-16 PROCEDURE — 80053 COMPREHEN METABOLIC PANEL: CPT

## 2024-05-16 PROCEDURE — 85025 COMPLETE CBC W/AUTO DIFF WBC: CPT

## 2024-05-16 PROCEDURE — 36415 COLL VENOUS BLD VENIPUNCTURE: CPT

## 2024-05-16 PROCEDURE — 84100 ASSAY OF PHOSPHORUS: CPT

## 2024-09-12 ENCOUNTER — APPOINTMENT (OUTPATIENT)
Dept: PRIMARY CARE | Facility: CLINIC | Age: 64
End: 2024-09-12
Payer: COMMERCIAL

## 2024-09-18 ENCOUNTER — HOSPITAL ENCOUNTER (OUTPATIENT)
Age: 64
Discharge: HOME | End: 2024-09-18
Payer: COMMERCIAL

## 2024-09-18 LAB
ALBUMIN SERPL-MCNC: 5 G/DL (ref 3.5–5.2)
ALP SERPL-CCNC: 98 U/L (ref 35–104)
ALT SERPL-CCNC: 15 U/L (ref 0–32)
ANION GAP SERPL CALCULATED.3IONS-SCNC: 11 MMOL/L (ref 7–16)
AST SERPL-CCNC: 22 U/L (ref 0–31)
BASOPHILS # BLD: 0 K/UL (ref 0–0.2)
BASOPHILS NFR BLD: 0 % (ref 0–2)
BILIRUB SERPL-MCNC: 0.7 MG/DL (ref 0–1.2)
BUN SERPL-MCNC: 17 MG/DL (ref 6–23)
CALCIUM SERPL-MCNC: 10.2 MG/DL (ref 8.6–10.2)
CHLORIDE SERPL-SCNC: 98 MMOL/L (ref 98–107)
CO2 SERPL-SCNC: 29 MMOL/L (ref 22–29)
CREAT SERPL-MCNC: 1.2 MG/DL (ref 0.5–1)
EOSINOPHIL # BLD: 0 K/UL (ref 0.05–0.5)
EOSINOPHILS RELATIVE PERCENT: 0 % (ref 0–6)
ERYTHROCYTE [DISTWIDTH] IN BLOOD BY AUTOMATED COUNT: 15.1 % (ref 11.5–15)
GFR, ESTIMATED: 49 ML/MIN/1.73M2
GLUCOSE SERPL-MCNC: 98 MG/DL (ref 74–99)
HCT VFR BLD AUTO: 37.6 % (ref 34–48)
HGB BLD-MCNC: 12.3 G/DL (ref 11.5–15.5)
IMM GRANULOCYTES # BLD AUTO: <0.03 K/UL (ref 0–0.58)
IMM GRANULOCYTES NFR BLD: 0 % (ref 0–5)
LYMPHOCYTES NFR BLD: 3.25 K/UL (ref 1.5–4)
LYMPHOCYTES RELATIVE PERCENT: 55 % (ref 20–42)
MCH RBC QN AUTO: 28.5 PG (ref 26–35)
MCHC RBC AUTO-ENTMCNC: 32.7 G/DL (ref 32–34.5)
MCV RBC AUTO: 87.2 FL (ref 80–99.9)
MONOCYTES NFR BLD: 0.38 K/UL (ref 0.1–0.95)
MONOCYTES NFR BLD: 7 % (ref 2–12)
NEUTROPHILS NFR BLD: 38 % (ref 43–80)
NEUTS SEG NFR BLD: 2.23 K/UL (ref 1.8–7.3)
PHOSPHATE SERPL-MCNC: 3.4 MG/DL (ref 2.5–4.5)
PLATELET # BLD AUTO: 216 K/UL (ref 130–450)
PMV BLD AUTO: 11.1 FL (ref 7–12)
POTASSIUM SERPL-SCNC: 3.5 MMOL/L (ref 3.5–5)
PROT SERPL-MCNC: 9 G/DL (ref 6.4–8.3)
RBC # BLD AUTO: 4.31 M/UL (ref 3.5–5.5)
SODIUM SERPL-SCNC: 138 MMOL/L (ref 132–146)
WBC OTHER # BLD: 5.9 K/UL (ref 4.5–11.5)

## 2024-09-18 PROCEDURE — 80053 COMPREHEN METABOLIC PANEL: CPT

## 2024-09-18 PROCEDURE — 85025 COMPLETE CBC W/AUTO DIFF WBC: CPT

## 2024-09-18 PROCEDURE — 84100 ASSAY OF PHOSPHORUS: CPT

## 2024-09-18 PROCEDURE — 36415 COLL VENOUS BLD VENIPUNCTURE: CPT

## 2024-09-24 ENCOUNTER — APPOINTMENT (OUTPATIENT)
Dept: PRIMARY CARE | Facility: CLINIC | Age: 64
End: 2024-09-24
Payer: COMMERCIAL

## 2024-09-24 VITALS
BODY MASS INDEX: 35.51 KG/M2 | DIASTOLIC BLOOD PRESSURE: 74 MMHG | OXYGEN SATURATION: 96 % | HEIGHT: 63 IN | WEIGHT: 200.4 LBS | TEMPERATURE: 97.6 F | HEART RATE: 60 BPM | SYSTOLIC BLOOD PRESSURE: 110 MMHG

## 2024-09-24 DIAGNOSIS — E03.9 ACQUIRED HYPOTHYROIDISM: ICD-10-CM

## 2024-09-24 DIAGNOSIS — R73.03 PREDIABETES: Primary | ICD-10-CM

## 2024-09-24 DIAGNOSIS — Z87.891 HISTORY OF NICOTINE DEPENDENCE: ICD-10-CM

## 2024-09-24 DIAGNOSIS — N18.31 STAGE 3A CHRONIC KIDNEY DISEASE (MULTI): ICD-10-CM

## 2024-09-24 DIAGNOSIS — I10 PRIMARY HYPERTENSION: ICD-10-CM

## 2024-09-24 DIAGNOSIS — F51.01 PRIMARY INSOMNIA: ICD-10-CM

## 2024-09-24 DIAGNOSIS — E78.2 MIXED HYPERLIPIDEMIA: ICD-10-CM

## 2024-09-24 PROCEDURE — 3078F DIAST BP <80 MM HG: CPT | Performed by: FAMILY MEDICINE

## 2024-09-24 PROCEDURE — 1159F MED LIST DOCD IN RCRD: CPT | Performed by: FAMILY MEDICINE

## 2024-09-24 PROCEDURE — 1158F ADVNC CARE PLAN TLK DOCD: CPT | Performed by: FAMILY MEDICINE

## 2024-09-24 PROCEDURE — 3008F BODY MASS INDEX DOCD: CPT | Performed by: FAMILY MEDICINE

## 2024-09-24 PROCEDURE — 3074F SYST BP LT 130 MM HG: CPT | Performed by: FAMILY MEDICINE

## 2024-09-24 PROCEDURE — 1123F ACP DISCUSS/DSCN MKR DOCD: CPT | Performed by: FAMILY MEDICINE

## 2024-09-24 PROCEDURE — 99215 OFFICE O/P EST HI 40 MIN: CPT | Performed by: FAMILY MEDICINE

## 2024-09-24 RX ORDER — ERGOCALCIFEROL 1.25 MG/1
1.25 CAPSULE ORAL WEEKLY
COMMUNITY
Start: 2024-05-17

## 2024-09-24 RX ORDER — ZOLPIDEM TARTRATE 10 MG/1
10 TABLET ORAL NIGHTLY PRN
COMMUNITY
Start: 2024-07-29 | End: 2024-10-27

## 2024-09-24 RX ORDER — OLMESARTAN MEDOXOMIL AND HYDROCHLOROTHIAZIDE 40/12.5 40; 12.5 MG/1; MG/1
1 TABLET ORAL DAILY
COMMUNITY
Start: 2024-05-17

## 2024-09-24 RX ORDER — AMLODIPINE BESYLATE 5 MG/1
5 TABLET ORAL EVERY MORNING
COMMUNITY

## 2024-09-24 RX ORDER — LEVOTHYROXINE SODIUM 50 UG/1
50 TABLET ORAL DAILY
COMMUNITY

## 2024-09-24 ASSESSMENT — ENCOUNTER SYMPTOMS
OCCASIONAL FEELINGS OF UNSTEADINESS: 0
LOSS OF SENSATION IN FEET: 0
DEPRESSION: 0

## 2024-09-24 ASSESSMENT — PATIENT HEALTH QUESTIONNAIRE - PHQ9
2. FEELING DOWN, DEPRESSED OR HOPELESS: NOT AT ALL
1. LITTLE INTEREST OR PLEASURE IN DOING THINGS: NOT AT ALL
SUM OF ALL RESPONSES TO PHQ9 QUESTIONS 1 AND 2: 0

## 2024-09-24 NOTE — PROGRESS NOTES
"Subjective   Patient ID: Lexie Hart is a 64 y.o. female who presents for Hypertension.    Walking daily   Likes to spend time w/ grandchildren       Patient presenting to establish care.  Per chart review it appears that she had recent visit in May 2024 where they discussed insomnia she was prescribed zolpidem at that time for 90 days however this appears to be a chronic prescription that she has been given from her previous PCP  Has been ongoing for several years; without ambien cannot sleep   Wihtout ambien she would lay in bed until 3AM without falling asleep; she would get up and get ready for work and get ready for work. She felt tired; she wanted to sleep, but her mind was not tired. Could clean house all night. Almost as if she doesn't require   Anxiety only sometimes.   Depressed - never     She is a former smoker with a 7 and half total pack years she quit in 2000 she had lab work completed in March 2024 at outside hospital.  Her hemoglobin A1c at that time was 6.0% lipid panel with a total cholesterol of 206  triglycerides 115 her CMP showed a mildly elevated fasting glucose of 1 of 2    GFR of 53 mildly elevated protein CBC relatively within normal limits she does follow with nephrology.    Mammogram was completed January 2024 and it was followed by an ultrasound of the left breast this showed no evidence of cystic or solid mass lesions normal screening thereafter. Bone density  scan was also completed in January 2024 and this did not show low bone density in the left femoral neck with a T-score of -1.8.    Takes vitamin D weekly       Objective   /74 (BP Location: Left arm, Patient Position: Sitting, BP Cuff Size: Large adult)   Pulse 60   Temp 36.4 °C (97.6 °F) (Skin)   Ht 1.6 m (5' 3\")   Wt 90.9 kg (200 lb 6.4 oz)   SpO2 96%   BMI 35.50 kg/m²     Physical Exam    Assessment/Plan   Problem List Items Addressed This Visit       Prediabetes - Primary    Overview     Last A1c was " 6.0% continue to monitor and encourage lifestyle modifications.         Stage 3a chronic kidney disease (Multi)    Mixed hyperlipidemia    Overview      last lipid panel done earlier this year does have hyperlipidemia with a total cholesterol of 206  she is not currently on cholesterol medication.         History of nicotine dependence    Primary insomnia    Overview     Had been following with previous PCP and was prescribed zolpidem 10 mg.         Primary hypertension    Overview     Currently well-controlled on antihypertensives with amlodipine 5 mg daily and olmesartan-hydrochlorothiazide 40-12.5 mg tablets BMP was done earlier this year she does have history of CKD stage III with GFR 53 and creatinine 1.5.  She follows with  nephrology         Acquired hypothyroidism    Overview     Currently on levothyroxine 50 mcg daily well-controlled.                  DO GA Lomas spent a total of 42 minutes on the date of the service which included preparing to see the patient, face-to-face patient care, completing clinical documentation, performing a medically appropriate examination, counseling and educating the patient/family/caregiver and ordering medications, tests, or procedures.

## 2024-10-11 ENCOUNTER — LAB (OUTPATIENT)
Dept: LAB | Facility: LAB | Age: 64
End: 2024-10-11
Payer: MEDICARE

## 2024-10-11 DIAGNOSIS — N18.31 STAGE 3A CHRONIC KIDNEY DISEASE (MULTI): ICD-10-CM

## 2024-10-11 LAB — PTH-INTACT SERPL-MCNC: 67.2 PG/ML (ref 18.5–88)

## 2024-10-11 PROCEDURE — 36415 COLL VENOUS BLD VENIPUNCTURE: CPT

## 2024-10-11 PROCEDURE — 83970 ASSAY OF PARATHORMONE: CPT

## 2024-12-11 ENCOUNTER — LAB (OUTPATIENT)
Dept: LAB | Facility: LAB | Age: 64
End: 2024-12-11
Payer: MEDICARE

## 2024-12-11 DIAGNOSIS — N18.32 CHRONIC KIDNEY DISEASE, STAGE 3B (MULTI): Primary | ICD-10-CM

## 2024-12-11 PROCEDURE — 36415 COLL VENOUS BLD VENIPUNCTURE: CPT

## 2024-12-11 PROCEDURE — 83521 IG LIGHT CHAINS FREE EACH: CPT

## 2024-12-11 PROCEDURE — 80069 RENAL FUNCTION PANEL: CPT

## 2024-12-11 PROCEDURE — 84156 ASSAY OF PROTEIN URINE: CPT

## 2024-12-11 PROCEDURE — 82306 VITAMIN D 25 HYDROXY: CPT

## 2024-12-11 PROCEDURE — 85025 COMPLETE CBC W/AUTO DIFF WBC: CPT

## 2024-12-11 PROCEDURE — 83970 ASSAY OF PARATHORMONE: CPT

## 2024-12-11 PROCEDURE — 81003 URINALYSIS AUTO W/O SCOPE: CPT

## 2024-12-11 PROCEDURE — 82570 ASSAY OF URINE CREATININE: CPT

## 2024-12-11 PROCEDURE — 82043 UR ALBUMIN QUANTITATIVE: CPT

## 2024-12-12 LAB
25(OH)D3 SERPL-MCNC: 64 NG/ML (ref 30–100)
ALBUMIN SERPL BCP-MCNC: 4.4 G/DL (ref 3.4–5)
ANION GAP SERPL CALC-SCNC: 18 MMOL/L (ref 10–20)
APPEARANCE UR: CLEAR
BASOPHILS # BLD AUTO: 0 X10*3/UL (ref 0–0.1)
BASOPHILS NFR BLD AUTO: 0 %
BILIRUB UR STRIP.AUTO-MCNC: NEGATIVE MG/DL
BUN SERPL-MCNC: 12 MG/DL (ref 6–23)
CALCIUM SERPL-MCNC: 9.9 MG/DL (ref 8.6–10.6)
CHLORIDE SERPL-SCNC: 92 MMOL/L (ref 98–107)
CO2 SERPL-SCNC: 29 MMOL/L (ref 21–32)
COLOR UR: COLORLESS
CREAT SERPL-MCNC: 1.07 MG/DL (ref 0.5–1.05)
CREAT UR-MCNC: 51.3 MG/DL (ref 20–320)
CREAT UR-MCNC: 51.3 MG/DL (ref 20–320)
EGFRCR SERPLBLD CKD-EPI 2021: 57 ML/MIN/1.73M*2
EOSINOPHIL # BLD AUTO: 0.02 X10*3/UL (ref 0–0.7)
EOSINOPHIL NFR BLD AUTO: 0.3 %
ERYTHROCYTE [DISTWIDTH] IN BLOOD BY AUTOMATED COUNT: 14 % (ref 11.5–14.5)
GLUCOSE SERPL-MCNC: 94 MG/DL (ref 74–99)
GLUCOSE UR STRIP.AUTO-MCNC: NORMAL MG/DL
HCT VFR BLD AUTO: 34.3 % (ref 36–46)
HGB BLD-MCNC: 11.6 G/DL (ref 12–16)
IMM GRANULOCYTES # BLD AUTO: 0.01 X10*3/UL (ref 0–0.7)
IMM GRANULOCYTES NFR BLD AUTO: 0.2 % (ref 0–0.9)
KAPPA LC SERPL-MCNC: 3.31 MG/DL (ref 0.33–1.94)
KAPPA LC/LAMBDA SER: 1.46 {RATIO} (ref 0.26–1.65)
KETONES UR STRIP.AUTO-MCNC: NEGATIVE MG/DL
LAMBDA LC SERPL-MCNC: 2.27 MG/DL (ref 0.57–2.63)
LEUKOCYTE ESTERASE UR QL STRIP.AUTO: NEGATIVE
LYMPHOCYTES # BLD AUTO: 2.5 X10*3/UL (ref 1.2–4.8)
LYMPHOCYTES NFR BLD AUTO: 39.6 %
MCH RBC QN AUTO: 28.5 PG (ref 26–34)
MCHC RBC AUTO-ENTMCNC: 33.8 G/DL (ref 32–36)
MCV RBC AUTO: 84 FL (ref 80–100)
MICROALBUMIN UR-MCNC: <7 MG/L
MICROALBUMIN/CREAT UR: NORMAL MG/G{CREAT}
MONOCYTES # BLD AUTO: 0.46 X10*3/UL (ref 0.1–1)
MONOCYTES NFR BLD AUTO: 7.3 %
NEUTROPHILS # BLD AUTO: 3.33 X10*3/UL (ref 1.2–7.7)
NEUTROPHILS NFR BLD AUTO: 52.6 %
NITRITE UR QL STRIP.AUTO: NEGATIVE
NRBC BLD-RTO: 0 /100 WBCS (ref 0–0)
PH UR STRIP.AUTO: 7 [PH]
PHOSPHATE SERPL-MCNC: 3.5 MG/DL (ref 2.5–4.9)
PLATELET # BLD AUTO: 309 X10*3/UL (ref 150–450)
POTASSIUM SERPL-SCNC: 3.9 MMOL/L (ref 3.5–5.3)
PROT UR STRIP.AUTO-MCNC: NEGATIVE MG/DL
PROT UR-ACNC: <4 MG/DL (ref 5–24)
PROT/CREAT UR: ABNORMAL MG/G{CREAT}
PTH-INTACT SERPL-MCNC: 58.5 PG/ML (ref 18.5–88)
RBC # BLD AUTO: 4.07 X10*6/UL (ref 4–5.2)
RBC # UR STRIP.AUTO: NEGATIVE /UL
SODIUM SERPL-SCNC: 135 MMOL/L (ref 136–145)
SP GR UR STRIP.AUTO: 1
UROBILINOGEN UR STRIP.AUTO-MCNC: NORMAL MG/DL
WBC # BLD AUTO: 6.3 X10*3/UL (ref 4.4–11.3)

## 2024-12-13 ENCOUNTER — LAB (OUTPATIENT)
Dept: LAB | Facility: LAB | Age: 64
End: 2024-12-13
Payer: MEDICARE

## 2024-12-13 DIAGNOSIS — N18.32 CHRONIC KIDNEY DISEASE, STAGE 3B (MULTI): Primary | ICD-10-CM

## 2024-12-13 LAB
COLLECT DURATION TIME SPEC: 24 HRS
PROT 24H UR-MCNC: <4 MG/DL (ref 5–24)
SPECIMEN VOL 24H UR: 2642 ML
TOTAL PROTEIN (MG/24HR) IN 24 HOUR URINE UPE: <106 MG/24H

## 2024-12-13 PROCEDURE — 86335 IMMUNFIX E-PHORSIS/URINE/CSF: CPT

## 2024-12-13 PROCEDURE — 84166 PROTEIN E-PHORESIS/URINE/CSF: CPT

## 2024-12-13 PROCEDURE — 81050 URINALYSIS VOLUME MEASURE: CPT

## 2024-12-13 PROCEDURE — 84156 ASSAY OF PROTEIN URINE: CPT

## 2024-12-17 LAB
ALBUMIN MFR UR ELPH: 32.6 %
ALPHA1 GLOB MFR UR ELPH: 9.2 %
ALPHA2 GLOB MFR UR ELPH: 19.3 %
B-GLOBULIN MFR UR ELPH: 13.5 %
GAMMA GLOB MFR UR ELPH: 25.4 %
IMMUNOFIXATION COMMENT: NORMAL
PATH REVIEW - URINE IMMUNOFIXATION: NORMAL
PATH REVIEW-URINE PROTEIN ELECTROPHORESIS: NORMAL
URINE ELECTROPHORESIS COMMENT: NORMAL

## 2024-12-18 ENCOUNTER — APPOINTMENT (OUTPATIENT)
Dept: PRIMARY CARE | Facility: CLINIC | Age: 64
End: 2024-12-18
Payer: MEDICARE

## 2024-12-18 DIAGNOSIS — F51.01 PRIMARY INSOMNIA: ICD-10-CM

## 2024-12-18 DIAGNOSIS — M10.9 GOUT INVOLVING TOE, UNSPECIFIED CAUSE, UNSPECIFIED CHRONICITY, UNSPECIFIED LATERALITY: Primary | ICD-10-CM

## 2024-12-18 PROCEDURE — 1123F ACP DISCUSS/DSCN MKR DOCD: CPT | Performed by: FAMILY MEDICINE

## 2024-12-18 PROCEDURE — 99214 OFFICE O/P EST MOD 30 MIN: CPT | Performed by: FAMILY MEDICINE

## 2024-12-18 RX ORDER — PREDNISONE 50 MG/1
50 TABLET ORAL DAILY
Qty: 5 TABLET | Refills: 0 | Status: SHIPPED | OUTPATIENT
Start: 2024-12-18 | End: 2024-12-23

## 2024-12-18 RX ORDER — ZOLPIDEM TARTRATE 10 MG/1
10 TABLET ORAL NIGHTLY PRN
Qty: 90 TABLET | Refills: 1 | Status: SHIPPED | OUTPATIENT
Start: 2024-12-18 | End: 2025-03-18

## 2024-12-18 NOTE — PROGRESS NOTES
Subjective   Patient ID: Lexie Hart is a 64 y.o. female who presents for No chief complaint on file..    Gout attack started on December 5th   Progressively worse; saw DR. Mobley   Has been taking allopurinol 100mg   Previous nephrologist had recently told her he was comfortable with her taking 200mg daily     She does feel like the gout is improving   Unable to ambulate; has been getting around w/ wheelchair.     She had an event on 12/5/24 and she is sure she overdid it.     She lost 16lbs while trying; drastic change   Went on a plant based diet       By including this disclaimer in the medical record, it is documented that the patient has been informed of the nature and limitations of telehealth services and has consented to participate in them. This disclaimer serves as a record of the patient's agreement to receive medical care through telehealth technology provided by [facility]. As such, no vitals were taken during this visit and the physical exam was limited to the capacity that can be obtained virtually.        Physical Exam:     Constitutional:   General: not in acute distress  Appearance: normal appearance, non-toxic, not ill-appearing or diaphoretic.   HENT:   Head: Normocephalic and atraumatic  Eyes: EOMI, PERRL  Neuro: alert, oriented x3      Assessment/Plan         Problem List Items Addressed This Visit       Primary insomnia    Overview     Had been following with previous PCP and was prescribed zolpidem 10 mg.         Relevant Medications    zolpidem (Ambien) 10 mg tablet     Other Visit Diagnoses       Gout involving toe, unspecified cause, unspecified chronicity, unspecified laterality    -  Primary    Relevant Medications    predniSONE (Deltasone) 50 mg tablet              DO GA Lomas spent a total of 15 minutes on the date of the service which included preparing to see the patient, face-to-face patient care, completing clinical documentation, performing a medically  appropriate examination, counseling and educating the patient/family/caregiver and ordering medications, tests, or procedures.

## 2025-01-11 ENCOUNTER — APPOINTMENT (OUTPATIENT)
Dept: RADIOLOGY | Facility: HOSPITAL | Age: 65
End: 2025-01-11
Payer: COMMERCIAL

## 2025-01-11 ENCOUNTER — HOSPITAL ENCOUNTER (EMERGENCY)
Facility: HOSPITAL | Age: 65
Discharge: HOME | End: 2025-01-11
Payer: COMMERCIAL

## 2025-01-11 VITALS
HEIGHT: 64 IN | WEIGHT: 188 LBS | OXYGEN SATURATION: 97 % | DIASTOLIC BLOOD PRESSURE: 75 MMHG | HEART RATE: 80 BPM | RESPIRATION RATE: 16 BRPM | BODY MASS INDEX: 32.1 KG/M2 | SYSTOLIC BLOOD PRESSURE: 130 MMHG | TEMPERATURE: 98.1 F

## 2025-01-11 DIAGNOSIS — M54.31 SCIATICA OF RIGHT SIDE: Primary | ICD-10-CM

## 2025-01-11 LAB
ALBUMIN SERPL BCP-MCNC: 4 G/DL (ref 3.4–5)
ALP SERPL-CCNC: 75 U/L (ref 33–136)
ALT SERPL W P-5'-P-CCNC: 9 U/L (ref 7–45)
ANION GAP SERPL CALC-SCNC: 13 MMOL/L (ref 10–20)
APPEARANCE UR: CLEAR
AST SERPL W P-5'-P-CCNC: 15 U/L (ref 9–39)
BASOPHILS # BLD AUTO: 0 X10*3/UL (ref 0–0.1)
BASOPHILS NFR BLD AUTO: 0 %
BILIRUB SERPL-MCNC: 0.7 MG/DL (ref 0–1.2)
BILIRUB UR STRIP.AUTO-MCNC: NEGATIVE MG/DL
BUN SERPL-MCNC: 11 MG/DL (ref 6–23)
CALCIUM SERPL-MCNC: 9.6 MG/DL (ref 8.6–10.3)
CHLORIDE SERPL-SCNC: 99 MMOL/L (ref 98–107)
CO2 SERPL-SCNC: 29 MMOL/L (ref 21–32)
COLOR UR: COLORLESS
CREAT SERPL-MCNC: 0.83 MG/DL (ref 0.5–1.05)
EGFRCR SERPLBLD CKD-EPI 2021: 78 ML/MIN/1.73M*2
EOSINOPHIL # BLD AUTO: 0 X10*3/UL (ref 0–0.7)
EOSINOPHIL NFR BLD AUTO: 0 %
ERYTHROCYTE [DISTWIDTH] IN BLOOD BY AUTOMATED COUNT: 15 % (ref 11.5–14.5)
GLUCOSE SERPL-MCNC: 102 MG/DL (ref 74–99)
GLUCOSE UR STRIP.AUTO-MCNC: NORMAL MG/DL
HCT VFR BLD AUTO: 30.8 % (ref 36–46)
HGB BLD-MCNC: 10.7 G/DL (ref 12–16)
IMM GRANULOCYTES # BLD AUTO: 0.05 X10*3/UL (ref 0–0.7)
IMM GRANULOCYTES NFR BLD AUTO: 0.5 % (ref 0–0.9)
KETONES UR STRIP.AUTO-MCNC: NEGATIVE MG/DL
LEUKOCYTE ESTERASE UR QL STRIP.AUTO: NEGATIVE
LYMPHOCYTES # BLD AUTO: 2.15 X10*3/UL (ref 1.2–4.8)
LYMPHOCYTES NFR BLD AUTO: 19.6 %
MCH RBC QN AUTO: 28.8 PG (ref 26–34)
MCHC RBC AUTO-ENTMCNC: 34.7 G/DL (ref 32–36)
MCV RBC AUTO: 83 FL (ref 80–100)
MONOCYTES # BLD AUTO: 0.51 X10*3/UL (ref 0.1–1)
MONOCYTES NFR BLD AUTO: 4.6 %
NEUTROPHILS # BLD AUTO: 8.27 X10*3/UL (ref 1.2–7.7)
NEUTROPHILS NFR BLD AUTO: 75.3 %
NITRITE UR QL STRIP.AUTO: NEGATIVE
NRBC BLD-RTO: 0 /100 WBCS (ref 0–0)
PH UR STRIP.AUTO: 6.5 [PH]
PLATELET # BLD AUTO: 239 X10*3/UL (ref 150–450)
POTASSIUM SERPL-SCNC: 3.4 MMOL/L (ref 3.5–5.3)
PROT SERPL-MCNC: 7 G/DL (ref 6.4–8.2)
PROT UR STRIP.AUTO-MCNC: NEGATIVE MG/DL
RBC # BLD AUTO: 3.71 X10*6/UL (ref 4–5.2)
RBC # UR STRIP.AUTO: NEGATIVE /UL
SODIUM SERPL-SCNC: 138 MMOL/L (ref 136–145)
SP GR UR STRIP.AUTO: 1
UROBILINOGEN UR STRIP.AUTO-MCNC: NORMAL MG/DL
WBC # BLD AUTO: 11 X10*3/UL (ref 4.4–11.3)

## 2025-01-11 PROCEDURE — 74176 CT ABD & PELVIS W/O CONTRAST: CPT | Performed by: RADIOLOGY

## 2025-01-11 PROCEDURE — 2500000001 HC RX 250 WO HCPCS SELF ADMINISTERED DRUGS (ALT 637 FOR MEDICARE OP): Performed by: NURSE PRACTITIONER

## 2025-01-11 PROCEDURE — 2500000004 HC RX 250 GENERAL PHARMACY W/ HCPCS (ALT 636 FOR OP/ED): Performed by: NURSE PRACTITIONER

## 2025-01-11 PROCEDURE — 99284 EMERGENCY DEPT VISIT MOD MDM: CPT | Mod: 25

## 2025-01-11 PROCEDURE — 85025 COMPLETE CBC W/AUTO DIFF WBC: CPT | Performed by: NURSE PRACTITIONER

## 2025-01-11 PROCEDURE — 80053 COMPREHEN METABOLIC PANEL: CPT | Performed by: NURSE PRACTITIONER

## 2025-01-11 PROCEDURE — 81003 URINALYSIS AUTO W/O SCOPE: CPT | Performed by: NURSE PRACTITIONER

## 2025-01-11 PROCEDURE — 36415 COLL VENOUS BLD VENIPUNCTURE: CPT | Performed by: NURSE PRACTITIONER

## 2025-01-11 PROCEDURE — 74176 CT ABD & PELVIS W/O CONTRAST: CPT

## 2025-01-11 RX ORDER — DICLOFENAC SODIUM 10 MG/G
4 GEL TOPICAL 4 TIMES DAILY PRN
Status: DISCONTINUED | OUTPATIENT
Start: 2025-01-11 | End: 2025-01-11 | Stop reason: HOSPADM

## 2025-01-11 RX ORDER — PREDNISONE 20 MG/1
20 TABLET ORAL ONCE
Status: DISCONTINUED | OUTPATIENT
Start: 2025-01-11 | End: 2025-01-11

## 2025-01-11 RX ORDER — PREDNISONE 20 MG/1
20 TABLET ORAL DAILY
Qty: 5 TABLET | Refills: 0 | Status: SHIPPED | OUTPATIENT
Start: 2025-01-11 | End: 2025-01-16

## 2025-01-11 RX ORDER — PREDNISONE 20 MG/1
20 TABLET ORAL ONCE
Status: COMPLETED | OUTPATIENT
Start: 2025-01-11 | End: 2025-01-11

## 2025-01-11 RX ORDER — DICLOFENAC SODIUM 10 MG/G
4 GEL TOPICAL 4 TIMES DAILY
Qty: 20 G | Refills: 0 | Status: SHIPPED | OUTPATIENT
Start: 2025-01-11

## 2025-01-11 RX ORDER — TIZANIDINE 2 MG/1
4 TABLET ORAL 2 TIMES DAILY
Qty: 28 TABLET | Refills: 0 | Status: SHIPPED | OUTPATIENT
Start: 2025-01-11 | End: 2025-01-18

## 2025-01-11 RX ADMIN — DICLOFENAC SODIUM 4 G: 10 GEL TOPICAL at 16:35

## 2025-01-11 RX ADMIN — PREDNISONE 20 MG: 20 TABLET ORAL at 16:31

## 2025-01-11 ASSESSMENT — PAIN SCALES - GENERAL
PAINLEVEL_OUTOF10: 7
PAINLEVEL_OUTOF10: 5 - MODERATE PAIN

## 2025-01-11 ASSESSMENT — PAIN DESCRIPTION - LOCATION: LOCATION: BACK

## 2025-01-11 ASSESSMENT — PAIN DESCRIPTION - DESCRIPTORS
DESCRIPTORS: ACHING;SHARP;SORE
DESCRIPTORS: ACHING;SHARP;SORE

## 2025-01-11 ASSESSMENT — COLUMBIA-SUICIDE SEVERITY RATING SCALE - C-SSRS
2. HAVE YOU ACTUALLY HAD ANY THOUGHTS OF KILLING YOURSELF?: NO
1. IN THE PAST MONTH, HAVE YOU WISHED YOU WERE DEAD OR WISHED YOU COULD GO TO SLEEP AND NOT WAKE UP?: NO
6. HAVE YOU EVER DONE ANYTHING, STARTED TO DO ANYTHING, OR PREPARED TO DO ANYTHING TO END YOUR LIFE?: NO

## 2025-01-11 ASSESSMENT — PAIN DESCRIPTION - DIRECTION: RADIATING_TOWARDS: BILAT LEGS

## 2025-01-11 ASSESSMENT — PAIN DESCRIPTION - ONSET: ONSET: SUDDEN

## 2025-01-11 ASSESSMENT — PAIN DESCRIPTION - ORIENTATION: ORIENTATION: RIGHT;LEFT;LOWER

## 2025-01-11 ASSESSMENT — PAIN - FUNCTIONAL ASSESSMENT: PAIN_FUNCTIONAL_ASSESSMENT: 0-10

## 2025-01-11 ASSESSMENT — PAIN DESCRIPTION - FREQUENCY: FREQUENCY: CONSTANT/CONTINUOUS

## 2025-01-11 ASSESSMENT — ACTIVITIES OF DAILY LIVING (ADL): EFFECT OF PAIN ON DAILY ACTIVITIES: DECREASED

## 2025-01-11 ASSESSMENT — PAIN DESCRIPTION - PROGRESSION: CLINICAL_PROGRESSION: NOT CHANGED

## 2025-01-11 ASSESSMENT — PAIN DESCRIPTION - PAIN TYPE: TYPE: ACUTE PAIN

## 2025-01-11 NOTE — ED PROVIDER NOTES
HPI   Chief Complaint   Patient presents with    Hip Pain     Pt arriving from home via EMS, pt states approx 2-3 days ago, woke up in bed w/ severe bilat low back pain, R worse than L, radiates down legs (R worse than L), mild to moderate when not moving but severe w/ movement, has not been able to walk much at all, denies trauma/injury, denies numbness/tingling or incontinence, distal pulse/motor/sensation intact, denies GI or  complaints.    Leg Pain    Back Pain       Patient is a 65 y/o F with PMHx of stage 3a CKD, hypertension, hyperlipidemia, hypothyroidism, and prediabetes is presenting today with R sided hip pain, brought to ED by ambulance. Pain began 3 days ago and has been worsening since, with pain now moving to L hip. Patient describes pain as constant and currently at 5/10; notes it increases to 10/10 with movement. Also endorses radiation of R hip pain down to R knee. Patient says she is unable to ambulate and has pain with sitting and standing. Has been taking OTC Tylenol every 6 hours with minimal relief of pain; last taken today at 10:30am. Denies fever, chills, changes in bowel/urinary movements, and numbness/tingling.       History provided by:  Patient   used: No            Patient History   History reviewed. No pertinent past medical history.  Past Surgical History:   Procedure Laterality Date    CARPAL TUNNEL RELEASE Bilateral     HYSTERECTOMY       Family History   Problem Relation Name Age of Onset    Breast cancer Mother      Heart disease Mother      Hypertension Mother      Other (sarcoma) Father       Social History     Tobacco Use    Smoking status: Former     Current packs/day: 0.50     Average packs/day: 0.5 packs/day for 17.0 years (8.5 ttl pk-yrs)     Types: Cigarettes     Start date: 1976     Passive exposure: Past    Smokeless tobacco: Never   Substance Use Topics    Alcohol use: Not Currently     Alcohol/week: 0.0 - 1.0 standard drinks of alcohol    Drug  use: Never       Physical Exam   ED Triage Vitals [01/11/25 1445]   Temperature Heart Rate Respirations BP   36.8 °C (98.2 °F) 84 18 146/80      Pulse Ox Temp Source Heart Rate Source Patient Position   97 % Oral Monitor Lying      BP Location FiO2 (%)     Left arm --       Physical Exam  Constitutional:       Appearance: She is obese.   HENT:      Head: Normocephalic and atraumatic.      Right Ear: Tympanic membrane normal.      Left Ear: Tympanic membrane normal.      Nose: Nose normal.      Mouth/Throat:      Mouth: Mucous membranes are moist.   Eyes:      Extraocular Movements: Extraocular movements intact.      Pupils: Pupils are equal, round, and reactive to light.   Cardiovascular:      Rate and Rhythm: Normal rate and regular rhythm.      Pulses: Normal pulses.      Heart sounds: Normal heart sounds.   Pulmonary:      Effort: Pulmonary effort is normal.      Breath sounds: Normal breath sounds.   Abdominal:      General: Abdomen is flat.      Palpations: Abdomen is soft.   Musculoskeletal:         General: Normal range of motion.      Cervical back: Normal range of motion and neck supple.   Skin:     General: Skin is warm.      Capillary Refill: Capillary refill takes less than 2 seconds.   Neurological:      General: No focal deficit present.      Mental Status: She is alert and oriented to person, place, and time.   Psychiatric:         Mood and Affect: Mood normal.         Behavior: Behavior normal.           ED Course & MDM   Diagnoses as of 01/11/25 1704   Sciatica of right side                 No data recorded     [place] Coma Scale Score: 15 (01/11/25 1500 : Ramon Whiteside RN)                           Medical Decision Making  She responded well to diclofenac and prednisone.  She had negative urinary tract infection.  Her potassium was repleted with 20 mill equivalent liquid potassium.  Her hemoglobin hematocrit was 10.7 and 30.8.  1 month ago was 11.4 and 34.  This appears to be stable.  There was no  leukocytosis or left shift. Urine was negative for nitrates leukocytes or blood.  This lowered my suspicion for nephrolithiasis.  CT of the abdomen pelvis noncontrast showed no definite renal stone or hydronephrosis.  Status postcholecystectomy.  Diverticulosis without any evidence of diverticulitis.  Because patient had stage III kidney I did not want a put her on Naprosyn.  She did receive external diclofenac cream which should not affect kidneys.  I put her on prednisone 20 mg daily for 5 days.  She was placed on tizanidine.  Patient seemed to respond well to treatment.  I discussed following up with Dr. Bhargav biarra and gave her contact information and requested appointment.  It was also discussed with patient there could be possible benefit with adjustment by chiropractor.  Patient and  were very comfortable with plan and she was safely discharged home with return precautions.  Patient had no history of cancer and this lowered my concern for compression syndrome.  There was no neurologic deficit.    Amount and/or Complexity of Data Reviewed  Labs: ordered.  Radiology: ordered and independent interpretation performed.        Procedure  Procedures     Thom Blanchard, LEISA-MORGAN  01/11/25 6265

## 2025-01-11 NOTE — ED TRIAGE NOTES
Pt arriving from home via EMS, pt states approx 2-3 days ago, woke up in bed w/ severe bilat low back pain, R worse than L, radiates down legs (R worse than L), mild to moderate when not moving but severe w/ movement, has not been able to walk much at all, denies trauma/injury, denies numbness/tingling or incontinence, distal pulse/motor/sensation intact, denies GI or  complaints.

## 2025-01-11 NOTE — Clinical Note
Lexie Hart was seen and treated in our emergency department on 1/11/2025.  She may return to work on 01/13/2025.       If you have any questions or concerns, please don't hesitate to call.      Thom Blanchard, APRN-CNP

## 2025-01-11 NOTE — ED NOTES
Pt discharged home with family via POV. No significant detrimental changes prior to discharge. Neuro/skin/respiratory grossly WDL. Belongings with pt/family.      Ramon Whiteside RN  01/11/25 5040

## 2025-01-12 LAB — HOLD SPECIMEN: NORMAL

## 2025-01-24 ENCOUNTER — TRANSCRIBE ORDERS (OUTPATIENT)
Dept: ORTHOPEDIC SURGERY | Facility: HOSPITAL | Age: 65
End: 2025-01-24
Payer: COMMERCIAL

## 2025-01-24 DIAGNOSIS — M54.50 LOW BACK PAIN, UNSPECIFIED BACK PAIN LATERALITY, UNSPECIFIED CHRONICITY, UNSPECIFIED WHETHER SCIATICA PRESENT: ICD-10-CM

## 2025-01-28 ENCOUNTER — OFFICE VISIT (OUTPATIENT)
Dept: ORTHOPEDIC SURGERY | Facility: CLINIC | Age: 65
End: 2025-01-28
Payer: COMMERCIAL

## 2025-01-28 ENCOUNTER — HOSPITAL ENCOUNTER (OUTPATIENT)
Dept: RADIOLOGY | Facility: CLINIC | Age: 65
Discharge: HOME | End: 2025-01-28
Payer: COMMERCIAL

## 2025-01-28 VITALS — BODY MASS INDEX: 31.24 KG/M2 | HEIGHT: 64 IN | WEIGHT: 183 LBS

## 2025-01-28 DIAGNOSIS — M54.16 LUMBAR RADICULOPATHY: Primary | ICD-10-CM

## 2025-01-28 DIAGNOSIS — M54.50 LOW BACK PAIN, UNSPECIFIED BACK PAIN LATERALITY, UNSPECIFIED CHRONICITY, UNSPECIFIED WHETHER SCIATICA PRESENT: ICD-10-CM

## 2025-01-28 DIAGNOSIS — M43.10 ACQUIRED SPONDYLOLISTHESIS: ICD-10-CM

## 2025-01-28 DIAGNOSIS — M54.31 SCIATICA OF RIGHT SIDE: ICD-10-CM

## 2025-01-28 PROCEDURE — 1123F ACP DISCUSS/DSCN MKR DOCD: CPT | Performed by: ORTHOPAEDIC SURGERY

## 2025-01-28 PROCEDURE — 3008F BODY MASS INDEX DOCD: CPT | Performed by: ORTHOPAEDIC SURGERY

## 2025-01-28 PROCEDURE — 99213 OFFICE O/P EST LOW 20 MIN: CPT | Performed by: ORTHOPAEDIC SURGERY

## 2025-01-28 PROCEDURE — 72110 X-RAY EXAM L-2 SPINE 4/>VWS: CPT

## 2025-01-28 PROCEDURE — 72110 X-RAY EXAM L-2 SPINE 4/>VWS: CPT | Performed by: RADIOLOGY

## 2025-01-28 PROCEDURE — 1159F MED LIST DOCD IN RCRD: CPT | Performed by: ORTHOPAEDIC SURGERY

## 2025-01-28 PROCEDURE — 99203 OFFICE O/P NEW LOW 30 MIN: CPT | Performed by: ORTHOPAEDIC SURGERY

## 2025-01-28 ASSESSMENT — PAIN - FUNCTIONAL ASSESSMENT: PAIN_FUNCTIONAL_ASSESSMENT: 0-10

## 2025-01-28 NOTE — PROGRESS NOTES
HPI:Lexie Hart is a 64-year-old woman who comes in today with complaints of some left buttock and hamstring pain which she calls sciatic pain.  It started recently.  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  Her sciatic symptoms are improving.  She has not done any recent physical therapy.      ROS:  Reviewed on EMR and patient intake sheet.    PMH/SH:  Reviewed on EMR and patient intake sheet.    Exam:  Physical Exam    Constitutional: Well appearing; no acute distress  Eyes: pupils are equal and round  Psych: normal affect  Respiratory: non-labored breathing  Cardiovascular: regular rate and rhythm  GI: non-distended abdomen  Musculoskeletal: no pain with range of motion of the hips bilaterally  Neurologic: [4+]/5 strength in the lower extremities bilaterally]; [negative] straight leg raise    Radiology:     X-rays demonstrate grade 1 spondylolisthesis L3-4 and L4-5.    Diagnosis:    Lumbar radiculopathy; degenerative spondylolisthesis    Assessment and Plan:   64-year-old woman with some acute lumbar radiculopathy in the setting of underlying spinal stenosis and degenerative spondylolisthesis.  At this time her symptoms are clinically improving.  I recommended physical therapy and core strengthening.  I can see her back as needed.    The patient was in agreement with the plan. At the end of the visit today, the patient felt that all questions had been answered satisfactorily.  The patient was pleased with the visit and very appreciative for the care rendered.     Thank you very much for the kind referral.  It is a privilege, and a pleasure, to partner with you in the care of your patients.  I would be delighted to assist you with any further consultations as needed.          Giovanni Xiao MD    Chief of Spine Surgery, [facility]  Director of Spine Service, [facility]  , Department of Orthopaedics  Case  [facility]  [address] [place] [address]  P: [phone]  [facility] Hospital[facility]actory[facility] HospitalLomaki.[employer]    This note was dictated with voice recognition software.  It has not been proofread for grammatical errors, typographical mistakes or other semantic inconsistencies.

## 2025-01-28 NOTE — LETTER
January 28, 2025     Veda Severino DO  [address]  [address]  [facility] [address]    Patient: Lexie Hart   YOB: 1960   Date of Visit: 1/28/2025       Dear Dr. Veda Severino DO:    Thank you for referring Lexie Hart to me for evaluation. Below are my notes for this consultation.  If you have questions, please do not hesitate to call me. I look forward to following your patient along with you.       Sincerely,     Giovanni Xiao MD      CC: Thom Blanchard, APRN-CNP  ______________________________________________________________________________________    HPI:Lexie Hart is a 64-year-old woman who comes in today with complaints of some left buttock and hamstring pain which she calls sciatic pain.  It started recently.  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  Her sciatic symptoms are improving.  She has not done any recent physical therapy.      ROS:  Reviewed on EMR and patient intake sheet.    PMH/SH:  Reviewed on EMR and patient intake sheet.    Exam:  Physical Exam    Constitutional: Well appearing; no acute distress  Eyes: pupils are equal and round  Psych: normal affect  Respiratory: non-labored breathing  Cardiovascular: regular rate and rhythm  GI: non-distended abdomen  Musculoskeletal: no pain with range of motion of the hips bilaterally  Neurologic: [4+]/5 strength in the lower extremities bilaterally]; [negative] straight leg raise    Radiology:     X-rays demonstrate grade 1 spondylolisthesis L3-4 and L4-5.    Diagnosis:    Lumbar radiculopathy; degenerative spondylolisthesis    Assessment and Plan:   64-year-old woman with some acute lumbar radiculopathy in the setting of underlying spinal stenosis and degenerative spondylolisthesis.  At this time her symptoms are clinically improving.  I recommended physical therapy and core strengthening.  I can see her back as needed.    The patient was in agreement with the plan. At the end of the visit today, the  patient felt that all questions had been answered satisfactorily.  The patient was pleased with the visit and very appreciative for the care rendered.     Thank you very much for the kind referral.  It is a privilege, and a pleasure, to partner with you in the care of your patients.  I would be delighted to assist you with any further consultations as needed.          Giovanni Xiao MD    Chief of Spine Surgery, [facility]  Director of Spine Service, [facility]  , Department of Orthopaedics  [facility]  [address] [place] [id number]  P: [phone]  [facility].[employer] Info    This note was dictated with voice recognition software.  It has not been proofread for grammatical errors, typographical mistakes or other semantic inconsistencies.

## 2025-01-30 DIAGNOSIS — I10 PRIMARY HYPERTENSION: ICD-10-CM

## 2025-01-30 RX ORDER — AMLODIPINE BESYLATE 5 MG/1
5 TABLET ORAL EVERY MORNING
Qty: 90 TABLET | Refills: 0 | Status: SHIPPED | OUTPATIENT
Start: 2025-01-30 | End: 2025-04-30

## 2025-01-30 RX ORDER — OLMESARTAN MEDOXOMIL AND HYDROCHLOROTHIAZIDE 40/12.5 40; 12.5 MG/1; MG/1
1 TABLET ORAL DAILY
Qty: 90 TABLET | Refills: 0 | Status: SHIPPED | OUTPATIENT
Start: 2025-01-30 | End: 2025-04-30

## 2025-01-31 ENCOUNTER — APPOINTMENT (OUTPATIENT)
Facility: CLINIC | Age: 65
End: 2025-01-31
Payer: MEDICARE

## 2025-02-13 ENCOUNTER — PATIENT MESSAGE (OUTPATIENT)
Dept: PRIMARY CARE | Facility: CLINIC | Age: 65
End: 2025-02-13
Payer: COMMERCIAL

## 2025-02-13 DIAGNOSIS — M10.9 GOUT INVOLVING TOE, UNSPECIFIED CAUSE, UNSPECIFIED CHRONICITY, UNSPECIFIED LATERALITY: Primary | ICD-10-CM

## 2025-02-13 RX ORDER — ALLOPURINOL 100 MG/1
100 TABLET ORAL 2 TIMES DAILY
Qty: 180 TABLET | Refills: 3 | Status: SHIPPED | OUTPATIENT
Start: 2025-02-13 | End: 2026-02-13

## 2025-02-14 ENCOUNTER — EVALUATION (OUTPATIENT)
Dept: PHYSICAL THERAPY | Facility: CLINIC | Age: 65
End: 2025-02-14
Payer: COMMERCIAL

## 2025-02-14 DIAGNOSIS — M54.16 LUMBAR RADICULOPATHY: Primary | ICD-10-CM

## 2025-02-14 PROCEDURE — 97140 MANUAL THERAPY 1/> REGIONS: CPT | Mod: GP | Performed by: PHYSICAL THERAPIST

## 2025-02-14 PROCEDURE — 97161 PT EVAL LOW COMPLEX 20 MIN: CPT | Mod: GP | Performed by: PHYSICAL THERAPIST

## 2025-02-14 ASSESSMENT — ENCOUNTER SYMPTOMS
DEPRESSION: 0
LOSS OF SENSATION IN FEET: 0
OCCASIONAL FEELINGS OF UNSTEADINESS: 0

## 2025-02-14 ASSESSMENT — PATIENT HEALTH QUESTIONNAIRE - PHQ9
1. LITTLE INTEREST OR PLEASURE IN DOING THINGS: NOT AT ALL
2. FEELING DOWN, DEPRESSED OR HOPELESS: NOT AT ALL
SUM OF ALL RESPONSES TO PHQ9 QUESTIONS 1 AND 2: 0

## 2025-02-14 NOTE — LETTER
February 14, 2025    Giovanni Xiao MD  [address]  Department Of Orthopedics  [facility] [address]    Patient: Lexie Hart   YOB: 1960   Date of Visit: 2/14/2025       Dear Giovanni Xiao MD  [address]  [place] [address]    The attached plan of care is being sent to you because your patient’s medical reimbursement requires that you certify the plan of care. Your signature is required to allow uninterrupted insurance coverage.      You may indicate your approval by signing below and faxing this form back to us at Dept Fax: [phone].    Please call Dept: [phone] with any questions or concerns.    Thank you for this referral,        Rosibel Amador, PT  [address] [address]    Payer: Payor: [employer] / Plan: [employer] / Product Type: *No Product type* /                                                                         Date:     Dear Rosibel Amador, PT,     Re: Ms. Lexie Hart, MRN:[id number]    I certify that I have reviewed the attached plan of care and it is medically necessary for Ms. Lexie Hart (1960) who is under my care.          ______________________________________                    _________________  Provider name and credentials                                           Date and time                                                                                      The following plan is in draft form.  Please refer to the current version for the most up-to-date information.                Plan of Care 2/14/25   Effective from: 2/14/2025  Effective to: 4/14/2025    Draft  Plan ID: [id number]               Participants as of 2/14/2025      Name Type Comments Contact Info    Giovnani Xiao MD Referring Provider  [phone]    Rosibel Amador, PT Consulting Physician  [phone]          Last Plan Note        Author: Rosibel Amador, PT Status: Incomplete Last edited: 2/14/2025 10:15 AM         Physical Therapy Evaluation    Patient Name: Lexie Hart  MRN: [id number]  Today's Date: 2/14/2025  Visit: 1/mn  DOS/DOI:  12/6/24  Medicare Certification Dates:  2/14/25 - 4/14/25  Referred by:   Giovanni Xiao  Time Calculation  Start Time: 1019  Stop Time: 1100  Time Calculation (min): 41 min  PT Evaluation Time Entry  PT Evaluation (Low) Time Entry: 30  PT Therapeutic Procedures Time Entry  Manual Therapy Time Entry: 9  Therapeutic Exercise Time Entry: 2                 Diagnosis:   1. Lumbar radiculopathy  Referral to Physical Therapy    Follow Up In Physical Therapy                PMH  - HTN, Thyroid disorder, kidney disease    HPI  She dev gout in her L foot on 12/6/24 but she had an event to go to so she could not rest her foot.  Her foot was swollen and painful by then end of the day.  She had to bear her weight on the Lateral aspect of her L foot and was limping.  She spoke with her MD and was given meds for the gout.  As a result of the limping and altered WB  She dev sciatic pain in the L low back and LE.  She went to her MD and a x-ray was done which was positive for anterolisthesis L3-5, spondylosis L3-S1 and facet deg L4-S1.  However she has had no previous episode of LBP.  She was referred to PT.  The Gout is clearing up but still present.  She see's her MD next month for this.    Precautions  None    SUBJECTIVE  Symptoms:  - she has LBP that rad into the lateral L thigh to midthigh level.  No N&T or LE weakness.  It is rated 3/10 (she is feeling 70% better already) and described as nerve pain.  The pain worsens with standing and walking. It resolves with sitting but if she sits too longs she has sig pain when trying to get up.  Functionally she is limited in cooking, cleaning her home,  sitting for work.    Patient's Living Environment:  - 2 story home.  2nd floor bedroom.  - lives with     Previous  Level of Function:  - walk to miles a day  - no issues prior to 12/6/24    Patient's Therapy Goals:  - return to walking for exercise  - do housework and daily activities    OBJECTIVE  Observation:    - presents with surgical shoe on L foot due to gout.  This is higher than her R shoe.  - note levoscoliosis     Palpation:   - tender at B PSIS area, L SIJ and L iliac crest  - L ASIS lower in supine    AROM:  - Lumbar flex = WNL and pain reduced.  Ext = 10 with LBP, SB = 50% B with pain during L SB, rot = WFL B with pain during L rot    Myotomes:  - L2 = 4-/5 B, L3 = 4+/5 R and 4/5  L, S2= 5/5 R and 4/5 L  L4-S1 = 5/5 R    MMT:  - TA = trace  - hip abd = 4+/5 B    Neuro:  - Reflfex L3 and S1 = 0/4 B    Special Tests:  - Negative slump, R SLR, compression over load, B torsion, B prone knee flex, PA L1-5  - Positive L SLR, L pelvis stuck anterior with WB kinetic    Outcome Measure:  - Oswestry = 44%    ASSESSMENT  The patient is a 65 y/o female presenting to PT with LBP located at the L PSIS, SIJ and lateral thigh due to malaligned L SIJ and irritated deg changes of the lumbar spine.  She has been limping and wearing a surgical shoe due to gout in her L foot.  This has altered her use and mechanics of the L LE and pelvis causing pain.  She has difficulty with WB positions such as standing and walking as well as when changing positions or transferring.  She will benefit from PT to restore normal mechanics of the pelvis and to improve core stability for a return to all ADLS.    Is skilled care required:   yes  Rehab potential:  good  Clinical presentation:  Stable and/or uncomplicated characteristics       Complexity:   . Low complexity due to patient's clinical presentation being stable and uncomplicated by any significant comorbidities that may affect rehab tolerance and progression.       Personal factors effecting care:  gout in L foot altering mechanics of the low back and LE    PLAN  Frequency/duration:  2x/wk x 6  wks  Planned Interventions:  MT, therapeutic exercise, neuro re-ed, modalities prn  Patient/caregiver agrees with POC:  yes    TODAY'S TREATMENT  - evaluation of the low back completed  - manual therapy:  post rot L pelvis, shotgun technique.  Resisted L hip ext x10  - she was given a HEP as seen below:    Access Code: [email or web address]  URL: [email or web address]  Date: 02/14/2025  Prepared by: Rosibel Amador    Exercises  - Supine Single Knee to Chest Stretch  - 2 x daily - 7 x weekly - 1 sets - 3 reps - 30 sec hold  - Supine Lower Trunk Rotation  - 1 x daily - 7 x weekly - 3 sets - 10 reps  - Supine Hip Adduction Isometric with Ball  - 1 x daily - 7 x weekly - 3 sets - 10 reps  - Supine Transversus Abdominis Bracing - Hands on Stomach  - 1 x daily - 7 x weekly - 3 sets - 10 reps  - Hooklying Clamshell with Resistance  - 1 x daily - 7 x weekly - 3 sets - 10 reps    Goals:   Active       PT Problem       she will be independent with her HEP       Start:  02/14/25    Expected End:  02/21/25            demonstrate proper pelvic alignment and mechanics.       Start:  02/14/25    Expected End:  02/28/25            abolish radicular pain       Start:  02/14/25    Expected End:  02/28/25            demonstrate strong TA contraction for increased lumbar support       Start:  02/14/25    Expected End:  03/07/25            return to all walking, standing and transfer ADLS without symptoms       Start:  02/14/25    Expected End:  03/28/25

## 2025-02-14 NOTE — PROGRESS NOTES
Physical Therapy Evaluation    Patient Name: Lexie Hart  MRN: [id number]  Today's Date: 2/14/2025  Visit: 1/mn  DOS/DOI:  12/6/24  Medicare Certification Dates:  2/14/25 - 4/14/25  Referred by:   Giovanni Xiao  Time Calculation  Start Time: 1019  Stop Time: 1100  Time Calculation (min): 41 min  PT Evaluation Time Entry  PT Evaluation (Low) Time Entry: 30  PT Therapeutic Procedures Time Entry  Manual Therapy Time Entry: 9  Therapeutic Exercise Time Entry: 2                 Diagnosis:   1. Lumbar radiculopathy  Referral to Physical Therapy    Follow Up In Physical Therapy                PMH  - HTN, Thyroid disorder, kidney disease    HPI  She dev gout in her L foot on 12/6/24 but she had an event to go to so she could not rest her foot.  Her foot was swollen and painful by then end of the day.  She had to bear her weight on the Lateral aspect of her L foot and was limping.  She spoke with her MD and was given meds for the gout.  As a result of the limping and altered WB  She dev sciatic pain in the L low back and LE.  She went to her MD and a x-ray was done which was positive for anterolisthesis L3-5, spondylosis L3-S1 and facet deg L4-S1.  However she has had no previous episode of LBP.  She was referred to PT.  The Gout is clearing up but still present.  She see's her MD next month for this.    Precautions  None    SUBJECTIVE  Symptoms:  - she has LBP that rad into the lateral L thigh to midthigh level.  No N&T or LE weakness.  It is rated 3/10 (she is feeling 70% better already) and described as nerve pain.  The pain worsens with standing and walking. It resolves with sitting but if she sits too longs she has sig pain when trying to get up.  Functionally she is limited in cooking, cleaning her home,  sitting for work.    Patient's Living Environment:  - 2 story home.  2nd floor bedroom.  - lives with     Previous Level of Function:  - walk to miles a day  - no issues prior to 12/6/24    Patient's Therapy  Goals:  - return to walking for exercise  - do housework and daily activities    OBJECTIVE  Observation:    - presents with surgical shoe on L foot due to gout.  This is higher than her R shoe.  - note levoscoliosis     Palpation:   - tender at B PSIS area, L SIJ and L iliac crest  - L ASIS lower in supine    AROM:  - Lumbar flex = WNL and pain reduced.  Ext = 10 with LBP, SB = 50% B with pain during L SB, rot = WFL B with pain during L rot    Myotomes:  - L2 = 4-/5 B, L3 = 4+/5 R and 4/5  L, S2= 5/5 R and 4/5 L  L4-S1 = 5/5 R    MMT:  - TA = trace  - hip abd = 4+/5 B    Neuro:  - Reflfex L3 and S1 = 0/4 B    Special Tests:  - Negative slump, R SLR, compression over load, B torsion, B prone knee flex, PA L1-5  - Positive L SLR, L pelvis stuck anterior with WB kinetic    Outcome Measure:  - Oswestry = 44%    ASSESSMENT  The patient is a 63 y/o female presenting to PT with LBP located at the L PSIS, SIJ and lateral thigh due to malaligned L SIJ and irritated deg changes of the lumbar spine.  She has been limping and wearing a surgical shoe due to gout in her L foot.  This has altered her use and mechanics of the L LE and pelvis causing pain.  She has difficulty with WB positions such as standing and walking as well as when changing positions or transferring.  She will benefit from PT to restore normal mechanics of the pelvis and to improve core stability for a return to all ADLS.    Is skilled care required:   yes  Rehab potential:  good  Clinical presentation:  Stable and/or uncomplicated characteristics       Complexity:   . Low complexity due to patient's clinical presentation being stable and uncomplicated by any significant comorbidities that may affect rehab tolerance and progression.       Personal factors effecting care:  gout in L foot altering mechanics of the low back and LE    PLAN  Frequency/duration:  2x/wk x 6 wks  Planned Interventions:  MT, therapeutic exercise, neuro re-ed, modalities  prn  Patient/caregiver agrees with POC:  yes    TODAY'S TREATMENT  - evaluation of the low back completed  - manual therapy:  post rot L pelvis, shotgun technique.  Resisted L hip ext x10  - she was given a HEP as seen below:    Access Code: [email or web address]  URL: [email or web address]  Date: 02/14/2025  Prepared by: Rosibel Amador    Exercises  - Supine Single Knee to Chest Stretch  - 2 x daily - 7 x weekly - 1 sets - 3 reps - 30 sec hold  - Supine Lower Trunk Rotation  - 1 x daily - 7 x weekly - 3 sets - 10 reps  - Supine Hip Adduction Isometric with Ball  - 1 x daily - 7 x weekly - 3 sets - 10 reps  - Supine Transversus Abdominis Bracing - Hands on Stomach  - 1 x daily - 7 x weekly - 3 sets - 10 reps  - Hooklying Clamshell with Resistance  - 1 x daily - 7 x weekly - 3 sets - 10 reps    Goals:   Active       PT Problem       she will be independent with her HEP       Start:  02/14/25    Expected End:  02/21/25            demonstrate proper pelvic alignment and mechanics.       Start:  02/14/25    Expected End:  02/28/25            abolish radicular pain       Start:  02/14/25    Expected End:  02/28/25            demonstrate strong TA contraction for increased lumbar support       Start:  02/14/25    Expected End:  03/07/25            return to all walking, standing and transfer ADLS without symptoms       Start:  02/14/25    Expected End:  03/28/25

## 2025-02-21 ENCOUNTER — TREATMENT (OUTPATIENT)
Dept: PHYSICAL THERAPY | Facility: CLINIC | Age: 65
End: 2025-02-21
Payer: COMMERCIAL

## 2025-02-21 DIAGNOSIS — M54.16 LUMBAR RADICULOPATHY: ICD-10-CM

## 2025-02-21 PROCEDURE — 97140 MANUAL THERAPY 1/> REGIONS: CPT | Mod: GP | Performed by: PHYSICAL THERAPIST

## 2025-02-21 PROCEDURE — 97110 THERAPEUTIC EXERCISES: CPT | Mod: GP | Performed by: PHYSICAL THERAPIST

## 2025-02-21 NOTE — PROGRESS NOTES
Physical Therapy Treatment    Patient Name: Lexie Hart  MRN: [id number]  Today's Date: 2/21/2025  Visit 2/mn  DOS/DOI:  12/6/24  Medicare Certification Dates:  2/14/25 - 4/14/25  Referred by:   Giovanni Xiao  Time Calculation  Start Time: 1054  Stop Time: 1132  Time Calculation (min): 38 min     PT Therapeutic Procedures Time Entry  Manual Therapy Time Entry: 12  Therapeutic Exercise Time Entry: 26                 Diagnosis:   1. Lumbar radiculopathy  Follow Up In Physical Therapy            PRECAUTIONS:  None    SUBJECTIVE:  She reports L LBP with bending forward, sitting or standing rated 5/10.  She feels she is improving and the pain has decreased.  She has chronic L LBP even before this episode of gout started, but her pain is still worse than it's usual level.  The gout is improving in her L foot and swelling has reduced.    HEP compliance: partially    OBJECTIVE:  - Lumbar AROM flex = WNL with L LBP, ext = WFL with pain at back, SB = Wfl B with pain during L SB, rot = WFL B with pain during L rot  - lacks post rot of L pelvis with WB kinetic  - note improved gait and no longer uses a cane.  Continues to wear surgical shoe on the L foot.    Treatment:  - initiated exercise for core stab.  Therapeutic Exercise  Therapeutic Exercise Activity 1: SKTC B, 30 sec x 2  Therapeutic Exercise Activity 2: TA with hip add 2x10  Therapeutic Exercise Activity 3: knee tap 2x10  Therapeutic Exercise Activity 4: deadbug B with TA tight, x10  Therapeutic Exercise Activity 5: hip abd B, 2x10  Therapeutic Exercise Activity 6: supine clamshell GN TB, 2x10  Therapeutic Exercise Activity 7: bridge x10  Therapeutic Exercise Activity 8: squat 2x10  Therapeutic Exercise Activity 9: bike L1, 5 min  Manual Therapy  Manual Therapy Activity 1: post rot to L pelvis  Manual Therapy Activity 2: L lower lumbar gap mob  Manual Therapy Activity 3: L SB mob             ASSESSMENT:  Khadijah rx well, but does report some LBP with dead bug and  bridge exercise.  However, after rx AROM Flex and B SB pain free.  ext still caused L LBP.  Continue to suspect that as the gout heals and she can get out of the surgical shoe her LBP with decrease due to normal mechanics being restored.  She will benefit from continued therapy to further reduce pain and improve mechanics/core stab for return to ADLS.    Active       PT Problem       she will be independent with her HEP       Start:  02/14/25    Expected End:  02/21/25            demonstrate proper pelvic alignment and mechanics.       Start:  02/14/25    Expected End:  02/28/25            abolish radicular pain       Start:  02/14/25    Expected End:  02/28/25            demonstrate strong TA contraction for increased lumbar support       Start:  02/14/25    Expected End:  03/07/25            return to all walking, standing and transfer ADLS without symptoms       Start:  02/14/25    Expected End:  03/28/25              PLAN:  Increase reps as jennifer.

## 2025-02-26 ENCOUNTER — APPOINTMENT (OUTPATIENT)
Dept: PHYSICAL THERAPY | Facility: CLINIC | Age: 65
End: 2025-02-26
Payer: COMMERCIAL

## 2025-02-28 ENCOUNTER — APPOINTMENT (OUTPATIENT)
Dept: PHYSICAL THERAPY | Facility: CLINIC | Age: 65
End: 2025-02-28
Payer: COMMERCIAL

## 2025-03-04 ENCOUNTER — APPOINTMENT (OUTPATIENT)
Dept: PHYSICAL THERAPY | Facility: CLINIC | Age: 65
End: 2025-03-04
Payer: COMMERCIAL

## 2025-03-06 ENCOUNTER — DOCUMENTATION (OUTPATIENT)
Dept: PHYSICAL THERAPY | Facility: CLINIC | Age: 65
End: 2025-03-06
Payer: COMMERCIAL

## 2025-03-06 NOTE — PROGRESS NOTES
Physical Therapy                 Therapy Communication Note    Patient Name: Lexie Hart  MRN: [id number]  Today's Date: 3/6/2025     Discipline: Physical Therapy          Missed Visit Reason:  Called patient today as she no showed today's appt.  She has also cx the last 4 appts.  She stated she has been sick and that is why she hasn't come to her appts.  We confirmed her 3/11/25 appt.

## 2025-03-11 ENCOUNTER — APPOINTMENT (OUTPATIENT)
Dept: PHYSICAL THERAPY | Facility: CLINIC | Age: 65
End: 2025-03-11
Payer: COMMERCIAL

## 2025-03-13 ENCOUNTER — APPOINTMENT (OUTPATIENT)
Dept: PHYSICAL THERAPY | Facility: CLINIC | Age: 65
End: 2025-03-13
Payer: COMMERCIAL

## 2025-03-24 ENCOUNTER — APPOINTMENT (OUTPATIENT)
Dept: PRIMARY CARE | Facility: CLINIC | Age: 65
End: 2025-03-24
Payer: COMMERCIAL

## 2025-03-27 ENCOUNTER — APPOINTMENT (OUTPATIENT)
Dept: PHYSICAL THERAPY | Facility: CLINIC | Age: 65
End: 2025-03-27
Payer: COMMERCIAL

## 2025-03-28 ENCOUNTER — APPOINTMENT (OUTPATIENT)
Dept: PRIMARY CARE | Facility: CLINIC | Age: 65
End: 2025-03-28
Payer: MEDICARE

## 2025-03-28 VITALS
HEIGHT: 63 IN | TEMPERATURE: 97.7 F | BODY MASS INDEX: 33.52 KG/M2 | SYSTOLIC BLOOD PRESSURE: 142 MMHG | DIASTOLIC BLOOD PRESSURE: 80 MMHG | OXYGEN SATURATION: 98 % | WEIGHT: 189.2 LBS | HEART RATE: 78 BPM

## 2025-03-28 DIAGNOSIS — H91.90 HEARING LOSS, UNSPECIFIED HEARING LOSS TYPE, UNSPECIFIED LATERALITY: ICD-10-CM

## 2025-03-28 DIAGNOSIS — Z00.00 ROUTINE GENERAL MEDICAL EXAMINATION AT HEALTH CARE FACILITY: Primary | ICD-10-CM

## 2025-03-28 DIAGNOSIS — D64.9 ANEMIA, UNSPECIFIED TYPE: ICD-10-CM

## 2025-03-28 DIAGNOSIS — Z13.6 SCREENING FOR CARDIOVASCULAR CONDITION: ICD-10-CM

## 2025-03-28 DIAGNOSIS — N18.31 STAGE 3A CHRONIC KIDNEY DISEASE (MULTI): ICD-10-CM

## 2025-03-28 DIAGNOSIS — Z13.89 SCREENING FOR MULTIPLE CONDITIONS: ICD-10-CM

## 2025-03-28 DIAGNOSIS — Z12.11 ENCOUNTER FOR SCREENING FOR MALIGNANT NEOPLASM OF COLON: ICD-10-CM

## 2025-03-28 DIAGNOSIS — Z12.31 SCREENING MAMMOGRAM FOR BREAST CANCER: ICD-10-CM

## 2025-03-28 DIAGNOSIS — R73.03 PREDIABETES: ICD-10-CM

## 2025-03-28 DIAGNOSIS — E03.9 ACQUIRED HYPOTHYROIDISM: ICD-10-CM

## 2025-03-28 DIAGNOSIS — M10.9 GOUT, UNSPECIFIED CAUSE, UNSPECIFIED CHRONICITY, UNSPECIFIED SITE: ICD-10-CM

## 2025-03-28 PROCEDURE — 3077F SYST BP >= 140 MM HG: CPT | Performed by: FAMILY MEDICINE

## 2025-03-28 PROCEDURE — 1160F RVW MEDS BY RX/DR IN RCRD: CPT | Performed by: FAMILY MEDICINE

## 2025-03-28 PROCEDURE — 1123F ACP DISCUSS/DSCN MKR DOCD: CPT | Performed by: FAMILY MEDICINE

## 2025-03-28 PROCEDURE — 99497 ADVNCD CARE PLAN 30 MIN: CPT | Performed by: FAMILY MEDICINE

## 2025-03-28 PROCEDURE — 3008F BODY MASS INDEX DOCD: CPT | Performed by: FAMILY MEDICINE

## 2025-03-28 PROCEDURE — 1170F FXNL STATUS ASSESSED: CPT | Performed by: FAMILY MEDICINE

## 2025-03-28 PROCEDURE — 1036F TOBACCO NON-USER: CPT | Performed by: FAMILY MEDICINE

## 2025-03-28 PROCEDURE — 1158F ADVNC CARE PLAN TLK DOCD: CPT | Performed by: FAMILY MEDICINE

## 2025-03-28 PROCEDURE — G0444 DEPRESSION SCREEN ANNUAL: HCPCS | Performed by: FAMILY MEDICINE

## 2025-03-28 PROCEDURE — 1159F MED LIST DOCD IN RCRD: CPT | Performed by: FAMILY MEDICINE

## 2025-03-28 PROCEDURE — 99214 OFFICE O/P EST MOD 30 MIN: CPT | Performed by: FAMILY MEDICINE

## 2025-03-28 PROCEDURE — 99397 PER PM REEVAL EST PAT 65+ YR: CPT | Performed by: FAMILY MEDICINE

## 2025-03-28 PROCEDURE — 3079F DIAST BP 80-89 MM HG: CPT | Performed by: FAMILY MEDICINE

## 2025-03-28 PROCEDURE — G0439 PPPS, SUBSEQ VISIT: HCPCS | Performed by: FAMILY MEDICINE

## 2025-03-28 ASSESSMENT — PATIENT HEALTH QUESTIONNAIRE - PHQ9
2. FEELING DOWN, DEPRESSED OR HOPELESS: NOT AT ALL
SUM OF ALL RESPONSES TO PHQ9 QUESTIONS 1 AND 2: 0
SUM OF ALL RESPONSES TO PHQ9 QUESTIONS 1 AND 2: 0
1. LITTLE INTEREST OR PLEASURE IN DOING THINGS: NOT AT ALL
1. LITTLE INTEREST OR PLEASURE IN DOING THINGS: NOT AT ALL
2. FEELING DOWN, DEPRESSED OR HOPELESS: NOT AT ALL

## 2025-03-28 ASSESSMENT — ACTIVITIES OF DAILY LIVING (ADL)
TAKING_MEDICATION: INDEPENDENT
DOING_HOUSEWORK: INDEPENDENT
MANAGING_FINANCES: INDEPENDENT
DRESSING: INDEPENDENT
GROCERY_SHOPPING: INDEPENDENT
BATHING: INDEPENDENT

## 2025-03-28 ASSESSMENT — ENCOUNTER SYMPTOMS
DEPRESSION: 0
LOSS OF SENSATION IN FEET: 0
OCCASIONAL FEELINGS OF UNSTEADINESS: 0

## 2025-03-28 NOTE — PATIENT INSTRUCTIONS
Schedule a Colonoscopy  Call [phone]  Endoscopy:  [phone] ([phone])    [facility]  [address] [place] [address]  [facility]  [address] [place] [address]  [facility]  [address] [place] [address]    [facility]   [address] [place] [address], [address] and in [place] at [address] [facility], [address]  [phone]    [facility]   [address] [facility], [address]  [place] [address] [phone]

## 2025-03-28 NOTE — PROGRESS NOTES
"Assessment/Plan    Problem List Items Addressed This Visit       Prediabetes    Overview     Last A1c was 6.0% continue to monitor and encourage lifestyle modifications.         Relevant Orders    Ferritin    CBC    Basic metabolic panel    Stage 3a chronic kidney disease (Multi)    Relevant Orders    Iron and TIBC    Ferritin    CBC    Basic metabolic panel    Acquired hypothyroidism    Overview     Currently on levothyroxine 50 mcg daily well-controlled.         Relevant Orders    Basic metabolic panel    TSH     Other Visit Diagnoses       Anemia, unspecified type    -  Primary    Relevant Orders    Iron and TIBC    Ferritin    CBC    Basic metabolic panel    Gout, unspecified cause, unspecified chronicity, unspecified site        Relevant Orders    Uric acid    Basic metabolic panel    Screening mammogram for breast cancer        Relevant Orders    BI mammo bilateral screening tomosynthesis    Encounter for screening for malignant neoplasm of colon        Relevant Orders    Colonoscopy Screening; Average Risk Patient    Hearing loss, unspecified hearing loss type, unspecified laterality        Relevant Orders    Referral to Audiology            Depression screening was completed during this visit. PHQ9 score 0 which is/not consistent with a diagnosis of depression. We discussed results and determined appropriate treatment plan if warranted.     Vaccinations: pt was recommended all vaccinations per guidelines regarding age and diagnosed conditions. Vaccinations were provided/given today as so ordered if the patient was amenable. If no vaccinations were provided, the patient declined after a risk/benefit discussion.     Discussed ACP with patient including health care power of  and living will. The patient determined his \"code status\" and it is documented in the chart. We spent >16 minutes discussing care planning during this visit.        Subjective   Patient ID: Lexie Hart is a 65 y.o. female who " "presents for Medicare Annual Wellness Visit Subsequent.     Code Status: Full Code   Living Will: yes   HCPOA: Jon Hart     Social History     Tobacco Use   Smoking Status Former    Current packs/day: 0.50    Average packs/day: 0.5 packs/day for 17.2 years (8.6 ttl pk-yrs)    Types: Cigarettes    Start date: 1976    Passive exposure: Past   Smokeless Tobacco Never       Screening Tests:   Lung Cancer Screening: not indicated.   Colon Cancer Screening: ordered   Breast Cancer Screening: Mammogram was completed January 2024 and it was followed by an ultrasound of the left breast this showed no evidence of cystic or solid mass lesions normal screening thereafter   Cervical Cancer Screening: not indicated    DXA Screening: . Bone density  scan was also completed in January 2024 and this did not show low bone density in the left femoral neck with a T-score of -1.8.       Patient Self Assessment of Health Status  Patient Self Assessment: Good  Functional Ability/Level of Safety  Cognitive Impairment Observed: No cognitive impairment observed  Cognitive Impairment Reported: No cognitive impairment reported by patient or family  Nutrition and Exercise  Current Diet: Well Balanced Diet  Adequate Fluid Intake: Yes  Caffeine: Yes  Exercise Frequency: Infrequently  ADL Screening  Hearing - Right Ear: Difficulty with noise  Hearing - Left Ear: Difficulty with noise  Bathing: Independent  Dressing: Independent  Walks in Home: Independent     Objective   /80 (BP Location: Left arm, Patient Position: Sitting, BP Cuff Size: Adult)   Pulse 78   Temp 36.5 °C (97.7 °F) (Skin)   Ht 1.6 m (5' 3\")   Wt 85.8 kg (189 lb 3.2 oz)   LMP  (LMP Unknown)   SpO2 98%   BMI 33.52 kg/m²       Physical Exam  Constitutional:       General: He is not in acute distress.     Appearance: Normal appearance. He is not ill-appearing, toxic-appearing or diaphoretic.   HENT:      Head: Normocephalic and atraumatic.   Eyes:      Extraocular " Movements: Extraocular movements intact.      Pupils: Pupils are equal, round, and reactive to light.   Cardiovascular:      Rate and Rhythm: Normal rate and regular rhythm.      Pulses: Normal pulses.      Heart sounds: Normal heart sounds.   Pulmonary:      Effort: Pulmonary effort is normal.      Breath sounds: Normal breath sounds.   Neurological:      Mental Status: He is alert.         Veda Severino DO     I spent a total of 35 minutes on the date of the service which included preparing to see the patient, face-to-face patient care, completing clinical documentation, performing a medically appropriate examination, counseling and educating the patient/family/caregiver and ordering medications, tests, or procedures.

## 2025-04-03 DIAGNOSIS — E03.9 ACQUIRED HYPOTHYROIDISM: ICD-10-CM

## 2025-04-03 DIAGNOSIS — E03.9 ACQUIRED HYPOTHYROIDISM: Primary | ICD-10-CM

## 2025-04-03 LAB
ANION GAP SERPL CALCULATED.4IONS-SCNC: 14 MMOL/L (CALC) (ref 7–17)
BUN SERPL-MCNC: 11 MG/DL (ref 7–25)
BUN/CREAT SERPL: NORMAL (CALC) (ref 6–22)
CALCIUM SERPL-MCNC: 10.1 MG/DL (ref 8.6–10.4)
CHLORIDE SERPL-SCNC: 101 MMOL/L (ref 98–110)
CO2 SERPL-SCNC: 24 MMOL/L (ref 20–32)
CREAT SERPL-MCNC: 1 MG/DL (ref 0.5–1.05)
EGFRCR SERPLBLD CKD-EPI 2021: 62 ML/MIN/1.73M2
ERYTHROCYTE [DISTWIDTH] IN BLOOD BY AUTOMATED COUNT: 15.1 % (ref 11–15)
FERRITIN SERPL-MCNC: 219 NG/ML (ref 16–288)
GLUCOSE SERPL-MCNC: 95 MG/DL (ref 65–99)
HCT VFR BLD AUTO: 36.7 % (ref 35–45)
HGB BLD-MCNC: 11.7 G/DL (ref 11.7–15.5)
IRON SATN MFR SERPL: 28 % (CALC) (ref 16–45)
IRON SERPL-MCNC: 93 MCG/DL (ref 45–160)
MCH RBC QN AUTO: 29.5 PG (ref 27–33)
MCHC RBC AUTO-ENTMCNC: 31.9 G/DL (ref 32–36)
MCV RBC AUTO: 92.4 FL (ref 80–100)
PLATELET # BLD AUTO: 229 THOUSAND/UL (ref 140–400)
PMV BLD REES-ECKER: 11.4 FL (ref 7.5–12.5)
POTASSIUM SERPL-SCNC: 3.9 MMOL/L (ref 3.5–5.3)
RBC # BLD AUTO: 3.97 MILLION/UL (ref 3.8–5.1)
SODIUM SERPL-SCNC: 139 MMOL/L (ref 135–146)
TIBC SERPL-MCNC: 329 MCG/DL (CALC) (ref 250–450)
TSH SERPL-ACNC: 6.17 MIU/L (ref 0.4–4.5)
URATE SERPL-MCNC: 5.8 MG/DL (ref 2.5–7)
WBC # BLD AUTO: 4.4 THOUSAND/UL (ref 3.8–10.8)

## 2025-04-03 RX ORDER — LEVOTHYROXINE SODIUM 75 UG/1
75 TABLET ORAL DAILY
Qty: 30 TABLET | Refills: 11 | Status: SHIPPED | OUTPATIENT
Start: 2025-04-03 | End: 2025-04-04

## 2025-04-04 RX ORDER — LEVOTHYROXINE SODIUM 75 UG/1
TABLET ORAL
Qty: 90 TABLET | Refills: 2 | Status: SHIPPED | OUTPATIENT
Start: 2025-04-04

## 2025-04-26 DIAGNOSIS — I10 PRIMARY HYPERTENSION: ICD-10-CM

## 2025-04-28 RX ORDER — OLMESARTAN MEDOXOMIL AND HYDROCHLOROTHIAZIDE 40/12.5 40; 12.5 MG/1; MG/1
1 TABLET ORAL DAILY
Qty: 90 TABLET | Refills: 0 | Status: SHIPPED | OUTPATIENT
Start: 2025-04-28

## 2025-04-28 RX ORDER — AMLODIPINE BESYLATE 5 MG/1
TABLET ORAL
Qty: 90 TABLET | Refills: 0 | Status: SHIPPED | OUTPATIENT
Start: 2025-04-28

## 2025-05-28 ENCOUNTER — DOCUMENTATION (OUTPATIENT)
Dept: PHYSICAL THERAPY | Facility: CLINIC | Age: 65
End: 2025-05-28
Payer: MEDICARE

## 2025-05-28 NOTE — PROGRESS NOTES
Physical Therapy    Discharge Summary    Name: Lexie Hart  MRN: [id number]  : 1960  Date: 25    Discharge Summary: PT    Discharge Information: Date of discharge 25, Date of last visit 25, Date of evaluation 25, Number of attended visits 2, Referred by Giovanni Xiao, and Referred for Lumbar rad    Therapy Summary: at her last session she reported 5/10 pain with lumbar flexion, sitting or standing.  She felt she had improved and her pain was returning to it's previous level (hx of chronic LBP) but had not reached that point yet.  She demonstrated:    - Lumbar AROM flex = WNL with L LBP, ext = WFL with pain at back, SB = Wfl B with pain during L SB, rot = WFL B with pain during L rot  - lacks post rot of L pelvis with WB kinetic  - note improved gait and no longer uses a cane.  Continues to wear surgical shoe on the L foot.    She cx and ns several visits and did not return to PT she is being discharged as a result.      Rehab Discharge Reason: Failed to schedule and/or keep follow-up appointment(s)

## 2025-07-01 DIAGNOSIS — F51.01 PRIMARY INSOMNIA: ICD-10-CM

## 2025-07-01 RX ORDER — ZOLPIDEM TARTRATE 10 MG/1
10 TABLET ORAL NIGHTLY PRN
Qty: 90 TABLET | Refills: 0 | Status: SHIPPED | OUTPATIENT
Start: 2025-07-01

## 2025-07-13 DIAGNOSIS — Z12.11 COLON CANCER SCREENING: Primary | ICD-10-CM

## 2025-07-13 RX ORDER — POLYETHYLENE GLYCOL 3350, SODIUM SULFATE ANHYDROUS, SODIUM BICARBONATE, SODIUM CHLORIDE, POTASSIUM CHLORIDE 236; 22.74; 6.74; 5.86; 2.97 G/4L; G/4L; G/4L; G/4L; G/4L
4000 POWDER, FOR SOLUTION ORAL ONCE
Qty: 4000 ML | Refills: 0 | Status: SHIPPED | OUTPATIENT
Start: 2025-07-13 | End: 2025-07-13

## 2025-08-11 DIAGNOSIS — I10 PRIMARY HYPERTENSION: ICD-10-CM

## 2025-08-11 RX ORDER — OLMESARTAN MEDOXOMIL AND HYDROCHLOROTHIAZIDE 40/12.5 40; 12.5 MG/1; MG/1
1 TABLET ORAL DAILY
Qty: 90 TABLET | Refills: 0 | Status: SHIPPED | OUTPATIENT
Start: 2025-08-11

## 2025-08-11 RX ORDER — AMLODIPINE BESYLATE 5 MG/1
TABLET ORAL
Qty: 90 TABLET | Refills: 0 | Status: SHIPPED | OUTPATIENT
Start: 2025-08-11

## 2025-09-02 ENCOUNTER — APPOINTMENT (OUTPATIENT)
Dept: GASTROENTEROLOGY | Facility: HOSPITAL | Age: 65
End: 2025-09-02
Payer: MEDICARE

## 2025-09-29 ENCOUNTER — APPOINTMENT (OUTPATIENT)
Dept: PRIMARY CARE | Facility: CLINIC | Age: 65
End: 2025-09-29
Payer: MEDICARE

## (undated) DEVICE — TOWEL OR BLUEE 16X26IN ST 8 PACK ORB08 16X26ORTWL

## (undated) DEVICE — SOLUTION IRRIG 1000ML 09% SOD CHL USP PIC PLAS CONTAINER

## (undated) DEVICE — 20 ML SYRINGE REGULAR TIP: Brand: MONOJECT

## (undated) DEVICE — CLOTH PREP W7.5XL7.5IN 2% CHG SKIN ALC AND RNS FREE

## (undated) DEVICE — GLOVE ORANGE PI 8   MSG9080

## (undated) DEVICE — GAUZE,SPONGE,4"X4",16PLY,XRAY,STRL,LF: Brand: MEDLINE

## (undated) DEVICE — Z DISCONTINUED USE 2275686 GLOVE SURG SZ 8 L12IN FNGR THK13MIL WHT ISOLEX POLYISOPRENE

## (undated) DEVICE — HOOK LOCK LATEX FREE ELASTIC BANDAGE 3INX5YD

## (undated) DEVICE — PEN: MARKING STD 100/CS: Brand: MEDICAL ACTION INDUSTRIES

## (undated) DEVICE — BASIC PACK: Brand: CONVERTORS

## (undated) DEVICE — NEEDLE HYPO 18GA L1.5IN PNK POLYPR HUB S STL THN WALL FILL

## (undated) DEVICE — CHLORAPREP 26ML ORANGE

## (undated) DEVICE — BANDAGE,GAUZE,BULKEE II,4.5"X4.1YD,STRL: Brand: MEDLINE

## (undated) DEVICE — TIBURON EXTREMITY SHEET: Brand: CONVERTORS

## (undated) DEVICE — DRESSING GZ XRFRM 4X4(25/BX 6BX/CS)

## (undated) DEVICE — INTENDED FOR TISSUE SEPARATION, AND OTHER PROCEDURES THAT REQUIRE A SHARP SURGICAL BLADE TO PUNCTURE OR CUT.: Brand: BARD-PARKER ® STAINLESS STEEL BLADES

## (undated) DEVICE — GOWN SURG XL SMS FAB NONREINFORCED RAGLAN SLV HK LOOP CLSR

## (undated) DEVICE — SUTURE NONABSORBABLE MONOFILAMENT 4-0 PS-2 18 IN BLU PROLENE 8682H

## (undated) DEVICE — GAUZE,SPONGE,4"X4",16PLY,STRL,LF,10/TRAY: Brand: MEDLINE

## (undated) DEVICE — HANDLE CVR PATENTED RETENTION DISC STRL LIGHT SHLD

## (undated) DEVICE — Z INACTIVE USE 2855128 SPONGE GZ 16 PLY WVN COT 4INX4IN  HHH

## (undated) DEVICE — 1810 FOAM BLOCK NEEDLE COUNTER: Brand: DEVON